# Patient Record
Sex: FEMALE | Race: WHITE | Employment: PART TIME | URBAN - METROPOLITAN AREA
[De-identification: names, ages, dates, MRNs, and addresses within clinical notes are randomized per-mention and may not be internally consistent; named-entity substitution may affect disease eponyms.]

---

## 2017-04-14 ENCOUNTER — HOSPITAL ENCOUNTER (OUTPATIENT)
Age: 32
Setting detail: SPECIMEN
Discharge: HOME | End: 2017-04-14
Payer: MEDICARE

## 2017-04-17 LAB
HPV SAMPLE: ABNORMAL
HPV SOURCE: ABNORMAL
HPV, GENOTYPE 16: DETECTED
HPV, GENOTYPE 18: NOT DETECTED
HPV, HIGH RISK OTHER: NOT DETECTED
HPV, INTERPRETATION: ABNORMAL

## 2017-04-19 LAB — CYTOLOGY REPORT: NORMAL

## 2017-10-26 ENCOUNTER — HOSPITAL ENCOUNTER (EMERGENCY)
Age: 32
Discharge: HOME | End: 2017-10-26
Attending: EMERGENCY MEDICINE
Payer: MEDICARE

## 2017-10-26 VITALS
DIASTOLIC BLOOD PRESSURE: 92 MMHG | TEMPERATURE: 97.6 F | SYSTOLIC BLOOD PRESSURE: 145 MMHG | BODY MASS INDEX: 28.04 KG/M2 | WEIGHT: 185 LBS | RESPIRATION RATE: 18 BRPM | OXYGEN SATURATION: 95 % | HEIGHT: 68 IN | HEART RATE: 77 BPM

## 2017-10-26 DIAGNOSIS — J02.9 ACUTE PHARYNGITIS, UNSPECIFIED ETIOLOGY: Primary | ICD-10-CM

## 2017-10-26 DIAGNOSIS — J01.00 ACUTE NON-RECURRENT MAXILLARY SINUSITIS: ICD-10-CM

## 2017-10-26 PROCEDURE — 99282 EMERGENCY DEPT VISIT SF MDM: CPT

## 2017-10-26 RX ORDER — AMOXICILLIN 250 MG/1
500 CAPSULE ORAL 3 TIMES DAILY
Qty: 60 CAPSULE | Refills: 0 | Status: SHIPPED | OUTPATIENT
Start: 2017-10-26 | End: 2017-11-05

## 2017-10-26 ASSESSMENT — PAIN DESCRIPTION - LOCATION: LOCATION: THROAT

## 2017-10-26 ASSESSMENT — PAIN SCALES - GENERAL: PAINLEVEL_OUTOF10: 10

## 2017-10-26 ASSESSMENT — PAIN DESCRIPTION - DESCRIPTORS: DESCRIPTORS: CONSTANT;SHARP;SORE

## 2017-10-26 ASSESSMENT — PAIN DESCRIPTION - FREQUENCY: FREQUENCY: CONTINUOUS

## 2018-04-21 ENCOUNTER — APPOINTMENT (OUTPATIENT)
Dept: CT IMAGING | Age: 33
End: 2018-04-21
Payer: MEDICARE

## 2018-04-21 ENCOUNTER — HOSPITAL ENCOUNTER (EMERGENCY)
Age: 33
Discharge: HOME | End: 2018-04-21
Attending: EMERGENCY MEDICINE
Payer: MEDICARE

## 2018-04-21 VITALS
HEART RATE: 80 BPM | OXYGEN SATURATION: 96 % | TEMPERATURE: 98.2 F | RESPIRATION RATE: 18 BRPM | HEIGHT: 68 IN | DIASTOLIC BLOOD PRESSURE: 70 MMHG | SYSTOLIC BLOOD PRESSURE: 120 MMHG | WEIGHT: 180 LBS | BODY MASS INDEX: 27.28 KG/M2

## 2018-04-21 DIAGNOSIS — R11.15 NON-INTRACTABLE CYCLICAL VOMITING WITH NAUSEA: Primary | ICD-10-CM

## 2018-04-21 DIAGNOSIS — R10.84 GENERALIZED ABDOMINAL PAIN: ICD-10-CM

## 2018-04-21 LAB
-: NORMAL
ABSOLUTE EOS #: 0 K/UL (ref 0–0.4)
ABSOLUTE IMMATURE GRANULOCYTE: ABNORMAL K/UL (ref 0–0.3)
ABSOLUTE LYMPH #: 1.5 K/UL (ref 1–4.8)
ABSOLUTE MONO #: 0.2 K/UL (ref 0.2–0.8)
ALBUMIN SERPL-MCNC: 4.7 G/DL (ref 3.5–5.2)
ALBUMIN/GLOBULIN RATIO: ABNORMAL (ref 1–2.5)
ALP BLD-CCNC: 76 U/L (ref 35–104)
ALT SERPL-CCNC: 32 U/L (ref 5–33)
AMORPHOUS: NORMAL
ANION GAP SERPL CALCULATED.3IONS-SCNC: 18 MMOL/L (ref 9–17)
AST SERPL-CCNC: 32 U/L
BACTERIA: NORMAL
BASOPHILS # BLD: 3 % (ref 0–2)
BASOPHILS ABSOLUTE: 0.2 K/UL (ref 0–0.2)
BILIRUB SERPL-MCNC: 0.23 MG/DL (ref 0.3–1.2)
BILIRUBIN URINE: NEGATIVE
BUN BLDV-MCNC: 7 MG/DL (ref 6–20)
BUN/CREAT BLD: 11 (ref 9–20)
CALCIUM SERPL-MCNC: 9.1 MG/DL (ref 8.6–10.4)
CASTS UA: NORMAL /LPF
CHLORIDE BLD-SCNC: 103 MMOL/L (ref 98–107)
CHP ED QC CHECK: NORMAL
CO2: 22 MMOL/L (ref 20–31)
COLOR: YELLOW
COMMENT UA: ABNORMAL
CREAT SERPL-MCNC: 0.66 MG/DL (ref 0.5–0.9)
CRYSTALS, UA: NORMAL /HPF
DIFFERENTIAL TYPE: ABNORMAL
EOSINOPHILS RELATIVE PERCENT: 0 % (ref 1–4)
EPITHELIAL CELLS UA: NORMAL /HPF (ref 0–5)
GFR AFRICAN AMERICAN: >60 ML/MIN
GFR NON-AFRICAN AMERICAN: >60 ML/MIN
GFR SERPL CREATININE-BSD FRML MDRD: ABNORMAL ML/MIN/{1.73_M2}
GFR SERPL CREATININE-BSD FRML MDRD: ABNORMAL ML/MIN/{1.73_M2}
GLUCOSE BLD-MCNC: 147 MG/DL (ref 70–99)
GLUCOSE URINE: NEGATIVE
HCT VFR BLD CALC: 45 % (ref 36–46)
HEMOGLOBIN: 15 G/DL (ref 12–16)
IMMATURE GRANULOCYTES: ABNORMAL %
KETONES, URINE: NEGATIVE
LEUKOCYTE ESTERASE, URINE: NEGATIVE
LIPASE: 26 U/L (ref 13–60)
LYMPHOCYTES # BLD: 23 % (ref 24–44)
MCH RBC QN AUTO: 30.2 PG (ref 26–34)
MCHC RBC AUTO-ENTMCNC: 33.3 G/DL (ref 31–37)
MCV RBC AUTO: 90.7 FL (ref 80–100)
MONOCYTES # BLD: 3 % (ref 1–7)
MUCUS: NORMAL
NITRITE, URINE: NEGATIVE
NRBC AUTOMATED: ABNORMAL PER 100 WBC
OTHER OBSERVATIONS UA: NORMAL
PDW BLD-RTO: 13.4 % (ref 11.5–14.5)
PH UA: 8 (ref 5–8)
PLATELET # BLD: 324 K/UL (ref 130–400)
PLATELET ESTIMATE: ABNORMAL
PMV BLD AUTO: 7.6 FL (ref 6–12)
POTASSIUM SERPL-SCNC: 3.6 MMOL/L (ref 3.7–5.3)
PREGNANCY TEST URINE, POC: NORMAL
PROTEIN UA: NEGATIVE
RBC # BLD: 4.96 M/UL (ref 4–5.2)
RBC # BLD: ABNORMAL 10*6/UL
RBC UA: NORMAL /HPF (ref 0–2)
RENAL EPITHELIAL, UA: NORMAL /HPF
SEG NEUTROPHILS: 71 % (ref 36–66)
SEGMENTED NEUTROPHILS ABSOLUTE COUNT: 4.6 K/UL (ref 1.8–7.7)
SODIUM BLD-SCNC: 143 MMOL/L (ref 135–144)
SPECIFIC GRAVITY UA: 1.02 (ref 1–1.03)
TOTAL PROTEIN: 8.1 G/DL (ref 6.4–8.3)
TRICHOMONAS: NORMAL
TURBIDITY: CLEAR
URINE HGB: ABNORMAL
UROBILINOGEN, URINE: NORMAL
WBC # BLD: 6.6 K/UL (ref 3.5–11)
WBC # BLD: ABNORMAL 10*3/UL
WBC UA: NORMAL /HPF (ref 0–5)
YEAST: NORMAL

## 2018-04-21 PROCEDURE — 96361 HYDRATE IV INFUSION ADD-ON: CPT

## 2018-04-21 PROCEDURE — 81001 URINALYSIS AUTO W/SCOPE: CPT

## 2018-04-21 PROCEDURE — 96376 TX/PRO/DX INJ SAME DRUG ADON: CPT

## 2018-04-21 PROCEDURE — 6360000004 HC RX CONTRAST MEDICATION: Performed by: EMERGENCY MEDICINE

## 2018-04-21 PROCEDURE — 80053 COMPREHEN METABOLIC PANEL: CPT

## 2018-04-21 PROCEDURE — 83690 ASSAY OF LIPASE: CPT

## 2018-04-21 PROCEDURE — 96374 THER/PROPH/DIAG INJ IV PUSH: CPT

## 2018-04-21 PROCEDURE — 74177 CT ABD & PELVIS W/CONTRAST: CPT

## 2018-04-21 PROCEDURE — 96372 THER/PROPH/DIAG INJ SC/IM: CPT

## 2018-04-21 PROCEDURE — 2580000003 HC RX 258: Performed by: EMERGENCY MEDICINE

## 2018-04-21 PROCEDURE — 84703 CHORIONIC GONADOTROPIN ASSAY: CPT

## 2018-04-21 PROCEDURE — 85025 COMPLETE CBC W/AUTO DIFF WBC: CPT

## 2018-04-21 PROCEDURE — 6360000002 HC RX W HCPCS: Performed by: EMERGENCY MEDICINE

## 2018-04-21 PROCEDURE — 96375 TX/PRO/DX INJ NEW DRUG ADDON: CPT

## 2018-04-21 PROCEDURE — 99284 EMERGENCY DEPT VISIT MOD MDM: CPT

## 2018-04-21 RX ORDER — SODIUM CHLORIDE 0.9 % (FLUSH) 0.9 %
10 SYRINGE (ML) INJECTION PRN
Status: DISCONTINUED | OUTPATIENT
Start: 2018-04-21 | End: 2018-04-21 | Stop reason: HOSPADM

## 2018-04-21 RX ORDER — 0.9 % SODIUM CHLORIDE 0.9 %
50 INTRAVENOUS SOLUTION INTRAVENOUS ONCE
Status: COMPLETED | OUTPATIENT
Start: 2018-04-21 | End: 2018-04-21

## 2018-04-21 RX ORDER — SODIUM CHLORIDE 9 MG/ML
INJECTION, SOLUTION INTRAVENOUS CONTINUOUS
Status: DISCONTINUED | OUTPATIENT
Start: 2018-04-21 | End: 2018-04-21 | Stop reason: HOSPADM

## 2018-04-21 RX ORDER — PANTOPRAZOLE SODIUM 20 MG/1
40 TABLET, DELAYED RELEASE ORAL DAILY
Qty: 30 TABLET | Refills: 0 | Status: SHIPPED | OUTPATIENT
Start: 2018-04-21 | End: 2020-01-04

## 2018-04-21 RX ORDER — DIPHENHYDRAMINE HYDROCHLORIDE 50 MG/ML
25 INJECTION INTRAMUSCULAR; INTRAVENOUS ONCE
Status: COMPLETED | OUTPATIENT
Start: 2018-04-21 | End: 2018-04-21

## 2018-04-21 RX ORDER — ONDANSETRON 4 MG/1
8 TABLET, ORALLY DISINTEGRATING ORAL ONCE
Status: COMPLETED | OUTPATIENT
Start: 2018-04-21 | End: 2018-04-21

## 2018-04-21 RX ORDER — MORPHINE SULFATE 4 MG/ML
4 INJECTION, SOLUTION INTRAMUSCULAR; INTRAVENOUS ONCE
Status: COMPLETED | OUTPATIENT
Start: 2018-04-21 | End: 2018-04-21

## 2018-04-21 RX ORDER — PROMETHAZINE HYDROCHLORIDE 25 MG/ML
25 INJECTION, SOLUTION INTRAMUSCULAR; INTRAVENOUS ONCE
Status: COMPLETED | OUTPATIENT
Start: 2018-04-21 | End: 2018-04-21

## 2018-04-21 RX ORDER — HALOPERIDOL 5 MG/ML
2 INJECTION INTRAMUSCULAR ONCE
Status: COMPLETED | OUTPATIENT
Start: 2018-04-21 | End: 2018-04-21

## 2018-04-21 RX ORDER — DICYCLOMINE HYDROCHLORIDE 10 MG/ML
20 INJECTION INTRAMUSCULAR ONCE
Status: COMPLETED | OUTPATIENT
Start: 2018-04-21 | End: 2018-04-21

## 2018-04-21 RX ORDER — 0.9 % SODIUM CHLORIDE 0.9 %
2000 INTRAVENOUS SOLUTION INTRAVENOUS ONCE
Status: COMPLETED | OUTPATIENT
Start: 2018-04-21 | End: 2018-04-21

## 2018-04-21 RX ORDER — ONDANSETRON 4 MG/1
8 TABLET, ORALLY DISINTEGRATING ORAL EVERY 8 HOURS PRN
Qty: 20 TABLET | Refills: 0 | Status: SHIPPED | OUTPATIENT
Start: 2018-04-21 | End: 2018-10-07

## 2018-04-21 RX ORDER — PROMETHAZINE HYDROCHLORIDE 25 MG/1
25 TABLET ORAL EVERY 6 HOURS PRN
Qty: 20 TABLET | Refills: 0 | Status: SHIPPED | OUTPATIENT
Start: 2018-04-21 | End: 2018-04-28

## 2018-04-21 RX ADMIN — DICYCLOMINE HYDROCHLORIDE 20 MG: 20 INJECTION, SOLUTION INTRAMUSCULAR at 14:18

## 2018-04-21 RX ADMIN — PROMETHAZINE HYDROCHLORIDE 25 MG: 25 INJECTION INTRAMUSCULAR; INTRAVENOUS at 12:13

## 2018-04-21 RX ADMIN — ONDANSETRON 8 MG: 4 TABLET, ORALLY DISINTEGRATING ORAL at 12:14

## 2018-04-21 RX ADMIN — MORPHINE SULFATE 4 MG: 4 INJECTION INTRAVENOUS at 14:18

## 2018-04-21 RX ADMIN — SODIUM CHLORIDE 2000 ML: 9 INJECTION, SOLUTION INTRAVENOUS at 12:13

## 2018-04-21 RX ADMIN — DIPHENHYDRAMINE HYDROCHLORIDE 25 MG: 50 INJECTION, SOLUTION INTRAMUSCULAR; INTRAVENOUS at 12:14

## 2018-04-21 RX ADMIN — SODIUM CHLORIDE: 9 INJECTION, SOLUTION INTRAVENOUS at 16:19

## 2018-04-21 RX ADMIN — Medication 10 ML: at 15:59

## 2018-04-21 RX ADMIN — SODIUM CHLORIDE 50 ML: 9 INJECTION, SOLUTION INTRAVENOUS at 15:58

## 2018-04-21 RX ADMIN — MORPHINE SULFATE 4 MG: 4 INJECTION INTRAVENOUS at 16:13

## 2018-04-21 RX ADMIN — IOPAMIDOL 125 ML: 755 INJECTION, SOLUTION INTRAVENOUS at 15:58

## 2018-04-21 RX ADMIN — HALOPERIDOL LACTATE 2 MG: 5 INJECTION, SOLUTION INTRAMUSCULAR at 16:13

## 2018-04-21 ASSESSMENT — PAIN SCALES - GENERAL
PAINLEVEL_OUTOF10: 10
PAINLEVEL_OUTOF10: 9
PAINLEVEL_OUTOF10: 9
PAINLEVEL_OUTOF10: 6

## 2018-04-21 ASSESSMENT — ENCOUNTER SYMPTOMS
TROUBLE SWALLOWING: 0
SORE THROAT: 1
ABDOMINAL PAIN: 1
BLOOD IN STOOL: 0
VOMITING: 1
CONSTIPATION: 0
RESPIRATORY NEGATIVE: 1
DIARRHEA: 0
RHINORRHEA: 1
NAUSEA: 1

## 2018-04-21 ASSESSMENT — PAIN DESCRIPTION - LOCATION: LOCATION: ABDOMEN

## 2018-04-21 ASSESSMENT — PAIN DESCRIPTION - PAIN TYPE: TYPE: ACUTE PAIN

## 2018-04-21 ASSESSMENT — PAIN DESCRIPTION - DESCRIPTORS: DESCRIPTORS: ACHING

## 2018-04-23 LAB — HCG, PREGNANCY URINE (POC): NEGATIVE

## 2018-05-07 ENCOUNTER — HOSPITAL ENCOUNTER (EMERGENCY)
Age: 33
Discharge: HOME | End: 2018-05-07
Attending: EMERGENCY MEDICINE
Payer: MEDICARE

## 2018-05-07 VITALS
WEIGHT: 185.44 LBS | RESPIRATION RATE: 18 BRPM | HEART RATE: 116 BPM | HEIGHT: 68 IN | DIASTOLIC BLOOD PRESSURE: 90 MMHG | OXYGEN SATURATION: 98 % | SYSTOLIC BLOOD PRESSURE: 145 MMHG | TEMPERATURE: 98.1 F | BODY MASS INDEX: 28.1 KG/M2

## 2018-05-07 DIAGNOSIS — R11.15 NON-INTRACTABLE CYCLICAL VOMITING WITH NAUSEA: Primary | ICD-10-CM

## 2018-05-07 LAB
ABSOLUTE EOS #: 0 K/UL (ref 0–0.4)
ABSOLUTE IMMATURE GRANULOCYTE: ABNORMAL K/UL (ref 0–0.3)
ABSOLUTE LYMPH #: 1.7 K/UL (ref 1–4.8)
ABSOLUTE MONO #: 0.3 K/UL (ref 0.2–0.8)
ALBUMIN SERPL-MCNC: 4.6 G/DL (ref 3.5–5.2)
ALBUMIN/GLOBULIN RATIO: ABNORMAL (ref 1–2.5)
ALP BLD-CCNC: 72 U/L (ref 35–104)
ALT SERPL-CCNC: 24 U/L (ref 5–33)
AMYLASE: 109 U/L (ref 28–100)
ANION GAP SERPL CALCULATED.3IONS-SCNC: 19 MMOL/L (ref 9–17)
AST SERPL-CCNC: 20 U/L
BASOPHILS # BLD: 0 % (ref 0–2)
BASOPHILS ABSOLUTE: 0 K/UL (ref 0–0.2)
BILIRUB SERPL-MCNC: 0.31 MG/DL (ref 0.3–1.2)
BILIRUBIN DIRECT: <0.08 MG/DL
BILIRUBIN, INDIRECT: ABNORMAL MG/DL (ref 0–1)
BUN BLDV-MCNC: 6 MG/DL (ref 6–20)
BUN/CREAT BLD: 8 (ref 9–20)
CALCIUM SERPL-MCNC: 9.5 MG/DL (ref 8.6–10.4)
CHLORIDE BLD-SCNC: 99 MMOL/L (ref 98–107)
CO2: 21 MMOL/L (ref 20–31)
CREAT SERPL-MCNC: 0.72 MG/DL (ref 0.5–0.9)
DIFFERENTIAL TYPE: ABNORMAL
EOSINOPHILS RELATIVE PERCENT: 0 % (ref 1–4)
GFR AFRICAN AMERICAN: >60 ML/MIN
GFR NON-AFRICAN AMERICAN: >60 ML/MIN
GFR SERPL CREATININE-BSD FRML MDRD: ABNORMAL ML/MIN/{1.73_M2}
GFR SERPL CREATININE-BSD FRML MDRD: ABNORMAL ML/MIN/{1.73_M2}
GLOBULIN: ABNORMAL G/DL (ref 1.5–3.8)
GLUCOSE BLD-MCNC: 143 MG/DL (ref 70–99)
HCG QUALITATIVE: NEGATIVE
HCT VFR BLD CALC: 46.1 % (ref 36–46)
HEMOGLOBIN: 15.2 G/DL (ref 12–16)
IMMATURE GRANULOCYTES: ABNORMAL %
LIPASE: 17 U/L (ref 13–60)
LYMPHOCYTES # BLD: 16 % (ref 24–44)
MCH RBC QN AUTO: 30 PG (ref 26–34)
MCHC RBC AUTO-ENTMCNC: 33 G/DL (ref 31–37)
MCV RBC AUTO: 91.1 FL (ref 80–100)
MONOCYTES # BLD: 3 % (ref 1–7)
NRBC AUTOMATED: ABNORMAL PER 100 WBC
PDW BLD-RTO: 13.4 % (ref 11.5–14.5)
PLATELET # BLD: 367 K/UL (ref 130–400)
PLATELET ESTIMATE: ABNORMAL
PMV BLD AUTO: 7.8 FL (ref 6–12)
POTASSIUM SERPL-SCNC: 3.4 MMOL/L (ref 3.7–5.3)
RBC # BLD: 5.06 M/UL (ref 4–5.2)
RBC # BLD: ABNORMAL 10*6/UL
SEG NEUTROPHILS: 81 % (ref 36–66)
SEGMENTED NEUTROPHILS ABSOLUTE COUNT: 8.5 K/UL (ref 1.8–7.7)
SODIUM BLD-SCNC: 139 MMOL/L (ref 135–144)
TOTAL PROTEIN: 8.4 G/DL (ref 6.4–8.3)
WBC # BLD: 10.5 K/UL (ref 3.5–11)
WBC # BLD: ABNORMAL 10*3/UL

## 2018-05-07 PROCEDURE — 80048 BASIC METABOLIC PNL TOTAL CA: CPT

## 2018-05-07 PROCEDURE — 96372 THER/PROPH/DIAG INJ SC/IM: CPT

## 2018-05-07 PROCEDURE — 96375 TX/PRO/DX INJ NEW DRUG ADDON: CPT

## 2018-05-07 PROCEDURE — 2500000003 HC RX 250 WO HCPCS: Performed by: EMERGENCY MEDICINE

## 2018-05-07 PROCEDURE — 6360000002 HC RX W HCPCS: Performed by: EMERGENCY MEDICINE

## 2018-05-07 PROCEDURE — 2580000003 HC RX 258: Performed by: EMERGENCY MEDICINE

## 2018-05-07 PROCEDURE — 96374 THER/PROPH/DIAG INJ IV PUSH: CPT

## 2018-05-07 PROCEDURE — 82150 ASSAY OF AMYLASE: CPT

## 2018-05-07 PROCEDURE — 99284 EMERGENCY DEPT VISIT MOD MDM: CPT

## 2018-05-07 PROCEDURE — C9113 INJ PANTOPRAZOLE SODIUM, VIA: HCPCS | Performed by: EMERGENCY MEDICINE

## 2018-05-07 PROCEDURE — 84703 CHORIONIC GONADOTROPIN ASSAY: CPT

## 2018-05-07 PROCEDURE — 80076 HEPATIC FUNCTION PANEL: CPT

## 2018-05-07 PROCEDURE — 85025 COMPLETE CBC W/AUTO DIFF WBC: CPT

## 2018-05-07 PROCEDURE — 83690 ASSAY OF LIPASE: CPT

## 2018-05-07 RX ORDER — 0.9 % SODIUM CHLORIDE 0.9 %
1000 INTRAVENOUS SOLUTION INTRAVENOUS ONCE
Status: COMPLETED | OUTPATIENT
Start: 2018-05-07 | End: 2018-05-07

## 2018-05-07 RX ORDER — ONDANSETRON 4 MG/1
4 TABLET, ORALLY DISINTEGRATING ORAL EVERY 8 HOURS PRN
Qty: 20 TABLET | Refills: 0 | Status: SHIPPED | OUTPATIENT
Start: 2018-05-07 | End: 2018-10-07

## 2018-05-07 RX ORDER — PANTOPRAZOLE SODIUM 40 MG/10ML
40 INJECTION, POWDER, LYOPHILIZED, FOR SOLUTION INTRAVENOUS ONCE
Status: COMPLETED | OUTPATIENT
Start: 2018-05-07 | End: 2018-05-07

## 2018-05-07 RX ORDER — 0.9 % SODIUM CHLORIDE 0.9 %
10 VIAL (ML) INJECTION ONCE
Status: COMPLETED | OUTPATIENT
Start: 2018-05-07 | End: 2018-05-07

## 2018-05-07 RX ORDER — OMEPRAZOLE 40 MG/1
40 CAPSULE, DELAYED RELEASE ORAL DAILY
Qty: 30 CAPSULE | Refills: 0 | Status: SHIPPED | OUTPATIENT
Start: 2018-05-07 | End: 2018-10-07

## 2018-05-07 RX ORDER — LORAZEPAM 2 MG/ML
1 INJECTION INTRAMUSCULAR ONCE
Status: COMPLETED | OUTPATIENT
Start: 2018-05-07 | End: 2018-05-07

## 2018-05-07 RX ORDER — LORAZEPAM 1 MG/1
1 TABLET ORAL DAILY PRN
Qty: 10 TABLET | Refills: 0 | Status: SHIPPED | OUTPATIENT
Start: 2018-05-07 | End: 2018-06-06

## 2018-05-07 RX ORDER — CHLORPROMAZINE HYDROCHLORIDE 25 MG/ML
25 INJECTION INTRAMUSCULAR ONCE
Status: COMPLETED | OUTPATIENT
Start: 2018-05-07 | End: 2018-05-07

## 2018-05-07 RX ORDER — CHLORPROMAZINE HYDROCHLORIDE 25 MG/1
25 TABLET, FILM COATED ORAL 3 TIMES DAILY PRN
Qty: 15 TABLET | Refills: 0 | Status: SHIPPED | OUTPATIENT
Start: 2018-05-07 | End: 2020-01-04

## 2018-05-07 RX ADMIN — LORAZEPAM 1 MG: 2 INJECTION INTRAMUSCULAR; INTRAVENOUS at 01:37

## 2018-05-07 RX ADMIN — SODIUM CHLORIDE 1000 ML: 9 INJECTION, SOLUTION INTRAVENOUS at 01:36

## 2018-05-07 RX ADMIN — Medication 10 ML: at 01:36

## 2018-05-07 RX ADMIN — PANTOPRAZOLE SODIUM 40 MG: 40 INJECTION, POWDER, FOR SOLUTION INTRAVENOUS at 01:36

## 2018-05-07 RX ADMIN — CHLORPROMAZINE HYDROCHLORIDE 25 MG: 25 INJECTION INTRAMUSCULAR at 01:38

## 2018-05-07 ASSESSMENT — PAIN SCALES - GENERAL
PAINLEVEL_OUTOF10: 9
PAINLEVEL_OUTOF10: 9

## 2018-05-07 ASSESSMENT — PAIN DESCRIPTION - ORIENTATION
ORIENTATION: LOWER
ORIENTATION: LOWER

## 2018-05-07 ASSESSMENT — PAIN DESCRIPTION - PAIN TYPE
TYPE: ACUTE PAIN
TYPE: ACUTE PAIN

## 2018-05-07 ASSESSMENT — PAIN DESCRIPTION - LOCATION
LOCATION: ABDOMEN
LOCATION: ABDOMEN

## 2018-05-07 ASSESSMENT — PAIN DESCRIPTION - DESCRIPTORS
DESCRIPTORS: ACHING
DESCRIPTORS: ACHING

## 2018-05-07 ASSESSMENT — ENCOUNTER SYMPTOMS
VOMITING: 1
RESPIRATORY NEGATIVE: 1
NAUSEA: 1

## 2018-05-07 ASSESSMENT — PAIN DESCRIPTION - FREQUENCY: FREQUENCY: CONTINUOUS

## 2018-08-23 ENCOUNTER — HOSPITAL ENCOUNTER (EMERGENCY)
Age: 33
Discharge: HOME | End: 2018-08-23
Attending: EMERGENCY MEDICINE
Payer: MEDICARE

## 2018-08-23 VITALS
DIASTOLIC BLOOD PRESSURE: 102 MMHG | SYSTOLIC BLOOD PRESSURE: 151 MMHG | RESPIRATION RATE: 16 BRPM | WEIGHT: 193.25 LBS | OXYGEN SATURATION: 100 % | TEMPERATURE: 97.9 F | HEIGHT: 68 IN | BODY MASS INDEX: 29.29 KG/M2 | HEART RATE: 78 BPM

## 2018-08-23 DIAGNOSIS — L23.9 ALLERGIC CONTACT DERMATITIS, UNSPECIFIED TRIGGER: Primary | ICD-10-CM

## 2018-08-23 PROCEDURE — 96374 THER/PROPH/DIAG INJ IV PUSH: CPT

## 2018-08-23 PROCEDURE — 99282 EMERGENCY DEPT VISIT SF MDM: CPT

## 2018-08-23 PROCEDURE — 6360000002 HC RX W HCPCS: Performed by: EMERGENCY MEDICINE

## 2018-08-23 RX ORDER — HYDROXYZINE HYDROCHLORIDE 25 MG/1
25 TABLET, FILM COATED ORAL EVERY 8 HOURS PRN
Qty: 20 TABLET | Refills: 0 | Status: SHIPPED | OUTPATIENT
Start: 2018-08-23 | End: 2018-09-02

## 2018-08-23 RX ORDER — PREDNISONE 10 MG/1
TABLET ORAL
Qty: 30 TABLET | Refills: 0 | Status: SHIPPED | OUTPATIENT
Start: 2018-08-23 | End: 2020-01-04

## 2018-08-23 RX ORDER — METHYLPREDNISOLONE SODIUM SUCCINATE 125 MG/2ML
125 INJECTION, POWDER, LYOPHILIZED, FOR SOLUTION INTRAMUSCULAR; INTRAVENOUS ONCE
Status: COMPLETED | OUTPATIENT
Start: 2018-08-23 | End: 2018-08-23

## 2018-08-23 RX ORDER — TRIAMCINOLONE ACETONIDE 0.25 MG/G
OINTMENT TOPICAL
Qty: 1 TUBE | Refills: 0 | Status: SHIPPED | OUTPATIENT
Start: 2018-08-23 | End: 2020-01-04

## 2018-08-23 RX ADMIN — METHYLPREDNISOLONE SODIUM SUCCINATE 125 MG: 125 INJECTION, POWDER, FOR SOLUTION INTRAMUSCULAR; INTRAVENOUS at 08:24

## 2018-08-23 ASSESSMENT — ENCOUNTER SYMPTOMS
FACIAL SWELLING: 0
ABDOMINAL PAIN: 0
EYE DISCHARGE: 0
EYE REDNESS: 0
VOMITING: 0
COUGH: 0
DIARRHEA: 0
SHORTNESS OF BREATH: 0
CONSTIPATION: 0
COLOR CHANGE: 0

## 2018-08-23 ASSESSMENT — PAIN SCALES - GENERAL: PAINLEVEL_OUTOF10: 6

## 2018-08-23 NOTE — ED NOTES
C/o red scaly rash to chest,omar groin and back with itching. sts has been there past week. sts feels fine otherwise. denies fever.      Heraclio Briceno RN  08/23/18 9629

## 2018-08-23 NOTE — ED PROVIDER NOTES
Itching     Dispense:  20 tablet     Refill:  0       Medical Decision Making: The patient is given IM Solu-Medrol. She is given also prescriptions for prednisone Kenalog and Atarax. Treatment diagnosis and follow-up were discussed with the patient. CONSULTS:  None    PROCEDURES:  None    FINAL IMPRESSION      1. Allergic contact dermatitis, unspecified trigger          DISPOSITION/PLAN   DISPOSITION Decision To Discharge 08/23/2018 08:06:16 AM      PATIENT REFERRED TO:   [facility] ED  [address] [facility]  [phone]    If symptoms worsen      DISCHARGE MEDICATIONS:     New Prescriptions    HYDROXYZINE (ATARAX) 25 MG TABLET    Take 1 tablet by mouth every 8 hours as needed for Itching    PREDNISONE (DELTASONE) 10 MG TABLET    Take 4 by mouth daily for 3 days then  3 by mouth daily for 3 days then  2 by mouth daily for 3 days then  1 by mouth daily for 3 days    TRIAMCINOLONE (KENALOG) 0.025 % OINTMENT    Apply topically 2 times daily.          (Please note that portions of this note were completed with a voice recognition program.  Efforts were made to edit the dictations but occasionally words are mis-transcribed.)    Gilberto Medeiros MD  Attending Emergency Physician             Gilberto Medeiros MD  08/23/18 1842

## 2018-10-07 ENCOUNTER — HOSPITAL ENCOUNTER (EMERGENCY)
Age: 33
Discharge: HOME | End: 2018-10-07
Attending: EMERGENCY MEDICINE
Payer: MEDICARE

## 2018-10-07 VITALS
HEIGHT: 68 IN | SYSTOLIC BLOOD PRESSURE: 143 MMHG | DIASTOLIC BLOOD PRESSURE: 102 MMHG | TEMPERATURE: 98 F | OXYGEN SATURATION: 99 % | BODY MASS INDEX: 28.99 KG/M2 | HEART RATE: 115 BPM | RESPIRATION RATE: 16 BRPM | WEIGHT: 191.3 LBS

## 2018-10-07 DIAGNOSIS — R11.2 NAUSEA AND VOMITING, INTRACTABILITY OF VOMITING NOT SPECIFIED, UNSPECIFIED VOMITING TYPE: Primary | ICD-10-CM

## 2018-10-07 DIAGNOSIS — K29.00 ACUTE GASTRITIS WITHOUT HEMORRHAGE, UNSPECIFIED GASTRITIS TYPE: ICD-10-CM

## 2018-10-07 LAB
-: ABNORMAL
ABSOLUTE EOS #: 0 K/UL (ref 0–0.4)
ABSOLUTE IMMATURE GRANULOCYTE: ABNORMAL K/UL (ref 0–0.3)
ABSOLUTE LYMPH #: 0.7 K/UL (ref 1–4.8)
ABSOLUTE MONO #: 0.2 K/UL (ref 0.2–0.8)
ALBUMIN SERPL-MCNC: 5.2 G/DL (ref 3.5–5.2)
ALBUMIN/GLOBULIN RATIO: ABNORMAL (ref 1–2.5)
ALP BLD-CCNC: 95 U/L (ref 35–104)
ALT SERPL-CCNC: 37 U/L (ref 5–33)
AMORPHOUS: ABNORMAL
AMYLASE: 57 U/L (ref 28–100)
ANION GAP SERPL CALCULATED.3IONS-SCNC: 25 MMOL/L (ref 9–17)
AST SERPL-CCNC: 36 U/L
BACTERIA: ABNORMAL
BASOPHILS # BLD: 1 % (ref 0–2)
BASOPHILS ABSOLUTE: 0.1 K/UL (ref 0–0.2)
BILIRUB SERPL-MCNC: 0.58 MG/DL (ref 0.3–1.2)
BILIRUBIN DIRECT: 0.14 MG/DL
BILIRUBIN URINE: NEGATIVE
BILIRUBIN, INDIRECT: 0.44 MG/DL (ref 0–1)
BUN BLDV-MCNC: 6 MG/DL (ref 6–20)
BUN/CREAT BLD: 9 (ref 9–20)
CALCIUM SERPL-MCNC: 9.4 MG/DL (ref 8.6–10.4)
CASTS UA: ABNORMAL /LPF
CHLORIDE BLD-SCNC: 103 MMOL/L (ref 98–107)
CHP ED QC CHECK: NORMAL
CO2: 16 MMOL/L (ref 20–31)
COLOR: YELLOW
COMMENT UA: ABNORMAL
CREAT SERPL-MCNC: 0.7 MG/DL (ref 0.5–0.9)
CRYSTALS, UA: ABNORMAL /HPF
DIFFERENTIAL TYPE: ABNORMAL
EOSINOPHILS RELATIVE PERCENT: 0 % (ref 1–4)
EPITHELIAL CELLS UA: ABNORMAL /HPF (ref 0–5)
ETHANOL PERCENT: 0.01 %
ETHANOL: 14 MG/DL
GFR AFRICAN AMERICAN: >60 ML/MIN
GFR NON-AFRICAN AMERICAN: >60 ML/MIN
GFR SERPL CREATININE-BSD FRML MDRD: ABNORMAL ML/MIN/{1.73_M2}
GFR SERPL CREATININE-BSD FRML MDRD: ABNORMAL ML/MIN/{1.73_M2}
GLOBULIN: ABNORMAL G/DL (ref 1.5–3.8)
GLUCOSE BLD-MCNC: 126 MG/DL (ref 70–99)
GLUCOSE URINE: NEGATIVE
HCT VFR BLD CALC: 42.6 % (ref 36–46)
HEMOGLOBIN: 14.6 G/DL (ref 12–16)
IMMATURE GRANULOCYTES: ABNORMAL %
KETONES, URINE: ABNORMAL
LEUKOCYTE ESTERASE, URINE: NEGATIVE
LIPASE: 26 U/L (ref 13–60)
LYMPHOCYTES # BLD: 11 % (ref 24–44)
MCH RBC QN AUTO: 31.5 PG (ref 26–34)
MCHC RBC AUTO-ENTMCNC: 34.3 G/DL (ref 31–37)
MCV RBC AUTO: 91.8 FL (ref 80–100)
MONOCYTES # BLD: 3 % (ref 1–7)
MUCUS: ABNORMAL
NITRITE, URINE: NEGATIVE
NRBC AUTOMATED: ABNORMAL PER 100 WBC
OTHER OBSERVATIONS UA: ABNORMAL
PDW BLD-RTO: 16.3 % (ref 11.5–14.5)
PH UA: 5 (ref 5–8)
PLATELET # BLD: 290 K/UL (ref 130–400)
PLATELET ESTIMATE: ABNORMAL
PMV BLD AUTO: 7.5 FL (ref 6–12)
POTASSIUM SERPL-SCNC: 3.5 MMOL/L (ref 3.7–5.3)
PREGNANCY TEST URINE, POC: NORMAL
PROTEIN UA: NEGATIVE
RBC # BLD: 4.64 M/UL (ref 4–5.2)
RBC # BLD: ABNORMAL 10*6/UL
RBC UA: ABNORMAL /HPF (ref 0–2)
RENAL EPITHELIAL, UA: ABNORMAL /HPF
SEG NEUTROPHILS: 85 % (ref 36–66)
SEGMENTED NEUTROPHILS ABSOLUTE COUNT: 5.7 K/UL (ref 1.8–7.7)
SODIUM BLD-SCNC: 144 MMOL/L (ref 135–144)
SPECIFIC GRAVITY UA: 1.02 (ref 1–1.03)
TOTAL PROTEIN: 8.5 G/DL (ref 6.4–8.3)
TRICHOMONAS: ABNORMAL
TURBIDITY: CLEAR
URINE HGB: ABNORMAL
UROBILINOGEN, URINE: NORMAL
WBC # BLD: 6.8 K/UL (ref 3.5–11)
WBC # BLD: ABNORMAL 10*3/UL
WBC UA: ABNORMAL /HPF (ref 0–5)
YEAST: ABNORMAL

## 2018-10-07 PROCEDURE — 83690 ASSAY OF LIPASE: CPT

## 2018-10-07 PROCEDURE — 81001 URINALYSIS AUTO W/SCOPE: CPT

## 2018-10-07 PROCEDURE — 84703 CHORIONIC GONADOTROPIN ASSAY: CPT

## 2018-10-07 PROCEDURE — 6360000002 HC RX W HCPCS: Performed by: EMERGENCY MEDICINE

## 2018-10-07 PROCEDURE — 96361 HYDRATE IV INFUSION ADD-ON: CPT

## 2018-10-07 PROCEDURE — G0480 DRUG TEST DEF 1-7 CLASSES: HCPCS

## 2018-10-07 PROCEDURE — 2580000003 HC RX 258: Performed by: EMERGENCY MEDICINE

## 2018-10-07 PROCEDURE — 80076 HEPATIC FUNCTION PANEL: CPT

## 2018-10-07 PROCEDURE — 80048 BASIC METABOLIC PNL TOTAL CA: CPT

## 2018-10-07 PROCEDURE — 36415 COLL VENOUS BLD VENIPUNCTURE: CPT

## 2018-10-07 PROCEDURE — 99283 EMERGENCY DEPT VISIT LOW MDM: CPT

## 2018-10-07 PROCEDURE — 96375 TX/PRO/DX INJ NEW DRUG ADDON: CPT

## 2018-10-07 PROCEDURE — 96365 THER/PROPH/DIAG IV INF INIT: CPT

## 2018-10-07 PROCEDURE — C9113 INJ PANTOPRAZOLE SODIUM, VIA: HCPCS | Performed by: EMERGENCY MEDICINE

## 2018-10-07 PROCEDURE — 82150 ASSAY OF AMYLASE: CPT

## 2018-10-07 PROCEDURE — 85025 COMPLETE CBC W/AUTO DIFF WBC: CPT

## 2018-10-07 RX ORDER — ONDANSETRON 4 MG/1
4 TABLET, FILM COATED ORAL EVERY 8 HOURS PRN
Qty: 20 TABLET | Refills: 0 | Status: SHIPPED | OUTPATIENT
Start: 2018-10-07 | End: 2020-01-04

## 2018-10-07 RX ORDER — SODIUM CHLORIDE 9 MG/ML
INJECTION, SOLUTION INTRAVENOUS CONTINUOUS
Status: DISCONTINUED | OUTPATIENT
Start: 2018-10-07 | End: 2018-10-07 | Stop reason: HOSPADM

## 2018-10-07 RX ORDER — OMEPRAZOLE 20 MG/1
20 CAPSULE, DELAYED RELEASE ORAL DAILY
Qty: 20 CAPSULE | Refills: 0 | Status: SHIPPED | OUTPATIENT
Start: 2018-10-07 | End: 2020-01-04

## 2018-10-07 RX ORDER — PROMETHAZINE HYDROCHLORIDE 25 MG/ML
12.5 INJECTION, SOLUTION INTRAMUSCULAR; INTRAVENOUS ONCE
Status: COMPLETED | OUTPATIENT
Start: 2018-10-07 | End: 2018-10-07

## 2018-10-07 RX ADMIN — SODIUM CHLORIDE: 9 INJECTION, SOLUTION INTRAVENOUS at 16:06

## 2018-10-07 RX ADMIN — HYDROMORPHONE HYDROCHLORIDE 0.5 MG: 1 INJECTION, SOLUTION INTRAMUSCULAR; INTRAVENOUS; SUBCUTANEOUS at 16:06

## 2018-10-07 RX ADMIN — SODIUM CHLORIDE 80 MG: 9 INJECTION, SOLUTION INTRAVENOUS at 16:19

## 2018-10-07 RX ADMIN — PROMETHAZINE HYDROCHLORIDE 12.5 MG: 25 INJECTION INTRAMUSCULAR; INTRAVENOUS at 16:06

## 2018-10-07 ASSESSMENT — ENCOUNTER SYMPTOMS
EYES NEGATIVE: 1
NAUSEA: 1
ABDOMINAL PAIN: 1
RESPIRATORY NEGATIVE: 1
ALLERGIC/IMMUNOLOGIC NEGATIVE: 1
VOMITING: 1

## 2018-10-07 ASSESSMENT — PAIN DESCRIPTION - LOCATION: LOCATION: ABDOMEN

## 2018-10-07 ASSESSMENT — PAIN SCALES - GENERAL
PAINLEVEL_OUTOF10: 8
PAINLEVEL_OUTOF10: 10
PAINLEVEL_OUTOF10: 10

## 2018-10-07 ASSESSMENT — PAIN SCALES - WONG BAKER: WONGBAKER_NUMERICALRESPONSE: 10

## 2018-10-07 ASSESSMENT — PAIN DESCRIPTION - FREQUENCY: FREQUENCY: CONTINUOUS

## 2018-10-07 NOTE — ED PROVIDER NOTES
Social History Main Topics    Smoking status: Current Every Day Smoker     Packs/day: 0.50     Types: Cigarettes    Smokeless tobacco: Never Used    Alcohol use 14.4 oz/week     24 Cans of beer per week      Comment: social    Drug use: No    Sexual activity: Yes     Partners: Male     Other Topics Concern    None     Social History Narrative    None         REVIEW OF SYSTEMS    (2-9 systems for level 4, 10 or more for level 5)     Review of Systems   Constitutional: Negative. Eyes: Negative. Respiratory: Negative. Cardiovascular: Negative. Gastrointestinal: Positive for abdominal pain, nausea and vomiting. Genitourinary: Negative. Musculoskeletal: Negative. Skin: Negative. Allergic/Immunologic: Negative. Neurological: Negative. Hematological: Negative. Psychiatric/Behavioral: Negative. Except as noted above the remainder of the review of systems was reviewed and negative. PHYSICAL EXAM    (up to 7 for level 4, 8 or more for level 5)     ED Triage Vitals [10/07/18 1529]   BP Temp Temp Source Pulse Resp SpO2 Height Weight   (!) 143/102 98 °F (36.7 °C) Oral 115 16 99 % 5' 8\" (1.727 m) 191 lb 4.8 oz (86.8 kg)       Physical Exam   Constitutional: She is oriented to person, place, and time. She appears well-developed and well-nourished. HENT:   Head: Normocephalic and atraumatic. Eyes: EOM are normal.   Neck: Normal range of motion. Neck supple. Cardiovascular: Normal rate and regular rhythm. Pulmonary/Chest: Effort normal and breath sounds normal. No respiratory distress. She has no rales. She exhibits no tenderness. Abdominal: Soft. She exhibits no distension and no mass. There is no rebound. Tenderness in the epigastric area   Musculoskeletal: Normal range of motion. Neurological: She is alert and oriented to person, place, and time. She has normal reflexes. Skin: Skin is warm and dry.          DIAGNOSTIC RESULTS     EKG: All EKG's are interpreted by

## 2018-10-08 LAB — HCG, PREGNANCY URINE (POC): NEGATIVE

## 2018-12-01 ENCOUNTER — HOSPITAL ENCOUNTER (EMERGENCY)
Age: 33
Discharge: HOME | End: 2018-12-01
Attending: EMERGENCY MEDICINE
Payer: MEDICARE

## 2018-12-01 VITALS
TEMPERATURE: 98.9 F | SYSTOLIC BLOOD PRESSURE: 124 MMHG | BODY MASS INDEX: 28.04 KG/M2 | OXYGEN SATURATION: 98 % | HEART RATE: 97 BPM | RESPIRATION RATE: 17 BRPM | HEIGHT: 68 IN | DIASTOLIC BLOOD PRESSURE: 78 MMHG | WEIGHT: 185 LBS

## 2018-12-01 DIAGNOSIS — R11.15 NON-INTRACTABLE CYCLICAL VOMITING WITH NAUSEA: Primary | ICD-10-CM

## 2018-12-01 LAB
ABSOLUTE EOS #: 0 K/UL (ref 0–0.4)
ABSOLUTE IMMATURE GRANULOCYTE: ABNORMAL K/UL (ref 0–0.3)
ABSOLUTE LYMPH #: 1.1 K/UL (ref 1–4.8)
ABSOLUTE MONO #: 0.6 K/UL (ref 0.2–0.8)
ALBUMIN SERPL-MCNC: 5.3 G/DL (ref 3.5–5.2)
ALBUMIN/GLOBULIN RATIO: ABNORMAL (ref 1–2.5)
ALP BLD-CCNC: 73 U/L (ref 35–104)
ALT SERPL-CCNC: 38 U/L (ref 5–33)
ANION GAP SERPL CALCULATED.3IONS-SCNC: 23 MMOL/L (ref 9–17)
AST SERPL-CCNC: 32 U/L
BASOPHILS # BLD: 0 % (ref 0–2)
BASOPHILS ABSOLUTE: 0 K/UL (ref 0–0.2)
BILIRUB SERPL-MCNC: 0.67 MG/DL (ref 0.3–1.2)
BUN BLDV-MCNC: 6 MG/DL (ref 6–20)
BUN/CREAT BLD: 7 (ref 9–20)
CALCIUM SERPL-MCNC: 9.5 MG/DL (ref 8.6–10.4)
CHLORIDE BLD-SCNC: 96 MMOL/L (ref 98–107)
CHP ED QC CHECK: NORMAL
CO2: 22 MMOL/L (ref 20–31)
CREAT SERPL-MCNC: 0.92 MG/DL (ref 0.5–0.9)
DIFFERENTIAL TYPE: ABNORMAL
EKG ATRIAL RATE: 124 BPM
EKG P AXIS: 70 DEGREES
EKG P-R INTERVAL: 134 MS
EKG Q-T INTERVAL: 326 MS
EKG QRS DURATION: 80 MS
EKG QTC CALCULATION (BAZETT): 468 MS
EKG R AXIS: 83 DEGREES
EKG T AXIS: 44 DEGREES
EKG VENTRICULAR RATE: 124 BPM
EOSINOPHILS RELATIVE PERCENT: 0 % (ref 1–4)
GFR AFRICAN AMERICAN: >60 ML/MIN
GFR NON-AFRICAN AMERICAN: >60 ML/MIN
GFR SERPL CREATININE-BSD FRML MDRD: ABNORMAL ML/MIN/{1.73_M2}
GFR SERPL CREATININE-BSD FRML MDRD: ABNORMAL ML/MIN/{1.73_M2}
GLUCOSE BLD-MCNC: 142 MG/DL (ref 70–99)
HCT VFR BLD CALC: 43.8 % (ref 36–46)
HEMOGLOBIN: 14.9 G/DL (ref 12–16)
IMMATURE GRANULOCYTES: ABNORMAL %
LACTIC ACID: 2.6 MMOL/L (ref 0.5–2.2)
LACTIC ACID: 5.3 MMOL/L (ref 0.5–2.2)
LIPASE: 16 U/L (ref 13–60)
LYMPHOCYTES # BLD: 7 % (ref 24–44)
MAGNESIUM: 1.2 MG/DL (ref 1.6–2.6)
MCH RBC QN AUTO: 31.1 PG (ref 26–34)
MCHC RBC AUTO-ENTMCNC: 34 G/DL (ref 31–37)
MCV RBC AUTO: 91.5 FL (ref 80–100)
MONOCYTES # BLD: 4 % (ref 1–7)
NRBC AUTOMATED: ABNORMAL PER 100 WBC
PDW BLD-RTO: 16.2 % (ref 11.5–14.5)
PLATELET # BLD: 328 K/UL (ref 130–400)
PLATELET ESTIMATE: ABNORMAL
PMV BLD AUTO: 8 FL (ref 6–12)
POTASSIUM SERPL-SCNC: 3.5 MMOL/L (ref 3.7–5.3)
PREGNANCY TEST URINE, POC: NORMAL
RBC # BLD: 4.79 M/UL (ref 4–5.2)
RBC # BLD: ABNORMAL 10*6/UL
SEG NEUTROPHILS: 89 % (ref 36–66)
SEGMENTED NEUTROPHILS ABSOLUTE COUNT: 14.4 K/UL (ref 1.8–7.7)
SODIUM BLD-SCNC: 141 MMOL/L (ref 135–144)
TOTAL PROTEIN: 8.9 G/DL (ref 6.4–8.3)
WBC # BLD: 16.1 K/UL (ref 3.5–11)
WBC # BLD: ABNORMAL 10*3/UL

## 2018-12-01 PROCEDURE — 96372 THER/PROPH/DIAG INJ SC/IM: CPT

## 2018-12-01 PROCEDURE — 96375 TX/PRO/DX INJ NEW DRUG ADDON: CPT

## 2018-12-01 PROCEDURE — 83690 ASSAY OF LIPASE: CPT

## 2018-12-01 PROCEDURE — 96361 HYDRATE IV INFUSION ADD-ON: CPT

## 2018-12-01 PROCEDURE — 84703 CHORIONIC GONADOTROPIN ASSAY: CPT

## 2018-12-01 PROCEDURE — 81003 URINALYSIS AUTO W/O SCOPE: CPT

## 2018-12-01 PROCEDURE — 2580000003 HC RX 258: Performed by: NURSE PRACTITIONER

## 2018-12-01 PROCEDURE — 83735 ASSAY OF MAGNESIUM: CPT

## 2018-12-01 PROCEDURE — 85025 COMPLETE CBC W/AUTO DIFF WBC: CPT

## 2018-12-01 PROCEDURE — 93005 ELECTROCARDIOGRAM TRACING: CPT

## 2018-12-01 PROCEDURE — 96365 THER/PROPH/DIAG IV INF INIT: CPT

## 2018-12-01 PROCEDURE — 99285 EMERGENCY DEPT VISIT HI MDM: CPT

## 2018-12-01 PROCEDURE — 83605 ASSAY OF LACTIC ACID: CPT

## 2018-12-01 PROCEDURE — 80053 COMPREHEN METABOLIC PANEL: CPT

## 2018-12-01 PROCEDURE — 6360000002 HC RX W HCPCS: Performed by: EMERGENCY MEDICINE

## 2018-12-01 PROCEDURE — C9113 INJ PANTOPRAZOLE SODIUM, VIA: HCPCS | Performed by: NURSE PRACTITIONER

## 2018-12-01 PROCEDURE — 6360000002 HC RX W HCPCS: Performed by: NURSE PRACTITIONER

## 2018-12-01 RX ORDER — LORAZEPAM 2 MG/ML
1 INJECTION INTRAMUSCULAR ONCE
Status: COMPLETED | OUTPATIENT
Start: 2018-12-01 | End: 2018-12-01

## 2018-12-01 RX ORDER — 0.9 % SODIUM CHLORIDE 0.9 %
10 VIAL (ML) INJECTION ONCE
Status: COMPLETED | OUTPATIENT
Start: 2018-12-01 | End: 2018-12-01

## 2018-12-01 RX ORDER — PANTOPRAZOLE SODIUM 40 MG/10ML
40 INJECTION, POWDER, LYOPHILIZED, FOR SOLUTION INTRAVENOUS ONCE
Status: COMPLETED | OUTPATIENT
Start: 2018-12-01 | End: 2018-12-01

## 2018-12-01 RX ORDER — PROMETHAZINE HYDROCHLORIDE 25 MG/1
25 TABLET ORAL EVERY 6 HOURS PRN
Qty: 24 TABLET | Refills: 0 | Status: SHIPPED | OUTPATIENT
Start: 2018-12-01 | End: 2018-12-08

## 2018-12-01 RX ORDER — 0.9 % SODIUM CHLORIDE 0.9 %
1000 INTRAVENOUS SOLUTION INTRAVENOUS ONCE
Status: COMPLETED | OUTPATIENT
Start: 2018-12-01 | End: 2018-12-01

## 2018-12-01 RX ORDER — DICYCLOMINE HYDROCHLORIDE 10 MG/ML
20 INJECTION INTRAMUSCULAR ONCE
Status: COMPLETED | OUTPATIENT
Start: 2018-12-01 | End: 2018-12-01

## 2018-12-01 RX ORDER — MAGNESIUM SULFATE 1 G/100ML
1 INJECTION INTRAVENOUS ONCE
Status: COMPLETED | OUTPATIENT
Start: 2018-12-01 | End: 2018-12-01

## 2018-12-01 RX ORDER — PROMETHAZINE HYDROCHLORIDE 25 MG/ML
12.5 INJECTION, SOLUTION INTRAMUSCULAR; INTRAVENOUS ONCE
Status: COMPLETED | OUTPATIENT
Start: 2018-12-01 | End: 2018-12-01

## 2018-12-01 RX ORDER — ONDANSETRON 2 MG/ML
4 INJECTION INTRAMUSCULAR; INTRAVENOUS ONCE
Status: COMPLETED | OUTPATIENT
Start: 2018-12-01 | End: 2018-12-01

## 2018-12-01 RX ADMIN — PROMETHAZINE HYDROCHLORIDE 12.5 MG: 25 INJECTION INTRAMUSCULAR; INTRAVENOUS at 13:28

## 2018-12-01 RX ADMIN — MAGNESIUM SULFATE HEPTAHYDRATE 1 G: 1 INJECTION, SOLUTION INTRAVENOUS at 13:45

## 2018-12-01 RX ADMIN — SODIUM CHLORIDE 1000 ML: 9 INJECTION, SOLUTION INTRAVENOUS at 13:28

## 2018-12-01 RX ADMIN — SODIUM CHLORIDE 1000 ML: 9 INJECTION, SOLUTION INTRAVENOUS at 14:56

## 2018-12-01 RX ADMIN — SODIUM CHLORIDE 1000 ML: 9 INJECTION, SOLUTION INTRAVENOUS at 12:44

## 2018-12-01 RX ADMIN — LORAZEPAM 1 MG: 2 INJECTION, SOLUTION INTRAMUSCULAR; INTRAVENOUS at 14:57

## 2018-12-01 RX ADMIN — PANTOPRAZOLE SODIUM 40 MG: 40 INJECTION, POWDER, FOR SOLUTION INTRAVENOUS at 14:57

## 2018-12-01 RX ADMIN — Medication 10 ML: at 14:58

## 2018-12-01 RX ADMIN — DICYCLOMINE HYDROCHLORIDE 20 MG: 20 INJECTION, SOLUTION INTRAMUSCULAR at 12:44

## 2018-12-01 RX ADMIN — ONDANSETRON 4 MG: 2 INJECTION INTRAMUSCULAR; INTRAVENOUS at 12:44

## 2018-12-01 ASSESSMENT — ENCOUNTER SYMPTOMS
SORE THROAT: 0
SHORTNESS OF BREATH: 0
CONSTIPATION: 0
COLOR CHANGE: 0
ABDOMINAL PAIN: 0
COUGH: 1
EYE PAIN: 0
WHEEZING: 0
NAUSEA: 1
VOMITING: 1
BACK PAIN: 0

## 2018-12-01 ASSESSMENT — PAIN DESCRIPTION - FREQUENCY: FREQUENCY: CONTINUOUS

## 2018-12-01 ASSESSMENT — PAIN DESCRIPTION - DESCRIPTORS: DESCRIPTORS: ACHING;SHARP;TENDER

## 2018-12-01 ASSESSMENT — PAIN DESCRIPTION - LOCATION: LOCATION: ABDOMEN;NECK;THROAT

## 2018-12-01 ASSESSMENT — PAIN SCALES - GENERAL: PAINLEVEL_OUTOF10: 8

## 2018-12-01 ASSESSMENT — PAIN SCALES - WONG BAKER: WONGBAKER_NUMERICALRESPONSE: 8

## 2018-12-01 NOTE — ED NOTES
Pt. C/o vomiting constantly since last night as well as muscular neck pain, generalized abdominal cramping some diarrhea. She presented to Urgent Care, but sent here after elevated BP and heartrate. Slightly anxious upon arrival. Pt. Also c/o feeling a \"pop\" to her upper abdomen after vomiting en route.       Lincoln Minor RN  12/01/18 0658

## 2018-12-03 LAB — HCG, PREGNANCY URINE (POC): NEGATIVE

## 2019-01-12 ENCOUNTER — HOSPITAL ENCOUNTER (EMERGENCY)
Age: 34
Discharge: HOME | End: 2019-01-13
Attending: EMERGENCY MEDICINE
Payer: MEDICARE

## 2019-01-12 VITALS
BODY MASS INDEX: 25.76 KG/M2 | DIASTOLIC BLOOD PRESSURE: 72 MMHG | OXYGEN SATURATION: 95 % | HEART RATE: 115 BPM | SYSTOLIC BLOOD PRESSURE: 128 MMHG | HEIGHT: 68 IN | RESPIRATION RATE: 20 BRPM | WEIGHT: 170 LBS | TEMPERATURE: 98.1 F

## 2019-01-12 DIAGNOSIS — R10.9 ABDOMINAL PAIN, UNSPECIFIED ABDOMINAL LOCATION: Primary | ICD-10-CM

## 2019-01-12 DIAGNOSIS — G43.A0 CYCLICAL VOMITING WITH NAUSEA, INTRACTABILITY OF VOMITING NOT SPECIFIED: ICD-10-CM

## 2019-01-12 LAB
ABSOLUTE EOS #: 0 K/UL (ref 0–0.4)
ABSOLUTE IMMATURE GRANULOCYTE: ABNORMAL K/UL (ref 0–0.3)
ABSOLUTE LYMPH #: 1 K/UL (ref 1–4.8)
ABSOLUTE MONO #: 0.2 K/UL (ref 0.2–0.8)
ALBUMIN SERPL-MCNC: 4.7 G/DL (ref 3.5–5.2)
ALBUMIN/GLOBULIN RATIO: ABNORMAL (ref 1–2.5)
ALP BLD-CCNC: 72 U/L (ref 35–104)
ALT SERPL-CCNC: 104 U/L (ref 5–33)
ANION GAP SERPL CALCULATED.3IONS-SCNC: 16 MMOL/L (ref 9–17)
AST SERPL-CCNC: 137 U/L
BASOPHILS # BLD: 0 % (ref 0–2)
BASOPHILS ABSOLUTE: 0 K/UL (ref 0–0.2)
BILIRUB SERPL-MCNC: 0.34 MG/DL (ref 0.3–1.2)
BILIRUBIN DIRECT: 0.11 MG/DL
BILIRUBIN URINE: NEGATIVE
BILIRUBIN, INDIRECT: 0.23 MG/DL (ref 0–1)
BUN BLDV-MCNC: 3 MG/DL (ref 6–20)
BUN/CREAT BLD: 5 (ref 9–20)
CALCIUM SERPL-MCNC: 9.8 MG/DL (ref 8.6–10.4)
CHLORIDE BLD-SCNC: 96 MMOL/L (ref 98–107)
CHP ED QC CHECK: NORMAL
CO2: 28 MMOL/L (ref 20–31)
COLOR: YELLOW
COMMENT UA: ABNORMAL
CREAT SERPL-MCNC: 0.58 MG/DL (ref 0.5–0.9)
DIFFERENTIAL TYPE: ABNORMAL
EOSINOPHILS RELATIVE PERCENT: 0 % (ref 1–4)
GFR AFRICAN AMERICAN: >60 ML/MIN
GFR NON-AFRICAN AMERICAN: >60 ML/MIN
GFR SERPL CREATININE-BSD FRML MDRD: ABNORMAL ML/MIN/{1.73_M2}
GFR SERPL CREATININE-BSD FRML MDRD: ABNORMAL ML/MIN/{1.73_M2}
GLOBULIN: ABNORMAL G/DL (ref 1.5–3.8)
GLUCOSE BLD-MCNC: 116 MG/DL (ref 70–99)
GLUCOSE URINE: NEGATIVE
HCG QUALITATIVE: NEGATIVE
HCT VFR BLD CALC: 45.4 % (ref 36–46)
HEMOGLOBIN: 15.4 G/DL (ref 12–16)
IMMATURE GRANULOCYTES: ABNORMAL %
KETONES, URINE: ABNORMAL
LEUKOCYTE ESTERASE, URINE: NEGATIVE
LIPASE: 27 U/L (ref 13–60)
LYMPHOCYTES # BLD: 16 % (ref 24–44)
MCH RBC QN AUTO: 31.7 PG (ref 26–34)
MCHC RBC AUTO-ENTMCNC: 33.9 G/DL (ref 31–37)
MCV RBC AUTO: 93.5 FL (ref 80–100)
MONOCYTES # BLD: 4 % (ref 1–7)
NITRITE, URINE: NEGATIVE
NRBC AUTOMATED: ABNORMAL PER 100 WBC
PDW BLD-RTO: 19.2 % (ref 11.5–14.5)
PH UA: 7.5 (ref 5–8)
PLATELET # BLD: 266 K/UL (ref 130–400)
PLATELET ESTIMATE: ABNORMAL
PMV BLD AUTO: 8.4 FL (ref 6–12)
POTASSIUM SERPL-SCNC: 3.5 MMOL/L (ref 3.7–5.3)
PREGNANCY TEST URINE, POC: NORMAL
PROTEIN UA: NEGATIVE
RBC # BLD: 4.85 M/UL (ref 4–5.2)
RBC # BLD: ABNORMAL 10*6/UL
SEG NEUTROPHILS: 80 % (ref 36–66)
SEGMENTED NEUTROPHILS ABSOLUTE COUNT: 5 K/UL (ref 1.8–7.7)
SODIUM BLD-SCNC: 140 MMOL/L (ref 135–144)
SPECIFIC GRAVITY UA: 1.01 (ref 1–1.03)
TOTAL PROTEIN: 8.4 G/DL (ref 6.4–8.3)
TURBIDITY: CLEAR
URINE HGB: NEGATIVE
UROBILINOGEN, URINE: NORMAL
WBC # BLD: 6.3 K/UL (ref 3.5–11)
WBC # BLD: ABNORMAL 10*3/UL

## 2019-01-12 PROCEDURE — 96361 HYDRATE IV INFUSION ADD-ON: CPT

## 2019-01-12 PROCEDURE — 2580000003 HC RX 258: Performed by: NURSE PRACTITIONER

## 2019-01-12 PROCEDURE — 96374 THER/PROPH/DIAG INJ IV PUSH: CPT

## 2019-01-12 PROCEDURE — 80048 BASIC METABOLIC PNL TOTAL CA: CPT

## 2019-01-12 PROCEDURE — 84703 CHORIONIC GONADOTROPIN ASSAY: CPT

## 2019-01-12 PROCEDURE — 6360000002 HC RX W HCPCS: Performed by: NURSE PRACTITIONER

## 2019-01-12 PROCEDURE — 85025 COMPLETE CBC W/AUTO DIFF WBC: CPT

## 2019-01-12 PROCEDURE — 80076 HEPATIC FUNCTION PANEL: CPT

## 2019-01-12 PROCEDURE — 99284 EMERGENCY DEPT VISIT MOD MDM: CPT

## 2019-01-12 PROCEDURE — 83690 ASSAY OF LIPASE: CPT

## 2019-01-12 PROCEDURE — 96375 TX/PRO/DX INJ NEW DRUG ADDON: CPT

## 2019-01-12 RX ORDER — 0.9 % SODIUM CHLORIDE 0.9 %
1000 INTRAVENOUS SOLUTION INTRAVENOUS ONCE
Status: COMPLETED | OUTPATIENT
Start: 2019-01-12 | End: 2019-01-12

## 2019-01-12 RX ORDER — DICYCLOMINE HYDROCHLORIDE 10 MG/1
10 CAPSULE ORAL EVERY 6 HOURS PRN
Qty: 20 CAPSULE | Refills: 0 | Status: SHIPPED | OUTPATIENT
Start: 2019-01-12 | End: 2020-01-04

## 2019-01-12 RX ORDER — METOCLOPRAMIDE HYDROCHLORIDE 5 MG/ML
10 INJECTION INTRAMUSCULAR; INTRAVENOUS ONCE
Status: COMPLETED | OUTPATIENT
Start: 2019-01-12 | End: 2019-01-12

## 2019-01-12 RX ORDER — HALOPERIDOL 5 MG/ML
5 INJECTION INTRAMUSCULAR ONCE
Status: COMPLETED | OUTPATIENT
Start: 2019-01-12 | End: 2019-01-12

## 2019-01-12 RX ORDER — PROMETHAZINE HYDROCHLORIDE 25 MG/1
25 TABLET ORAL EVERY 6 HOURS PRN
COMMUNITY
End: 2020-01-04

## 2019-01-12 RX ORDER — ONDANSETRON 4 MG/1
4 TABLET, ORALLY DISINTEGRATING ORAL EVERY 8 HOURS PRN
Qty: 20 TABLET | Refills: 0 | Status: SHIPPED | OUTPATIENT
Start: 2019-01-12 | End: 2020-01-04

## 2019-01-12 RX ADMIN — METOCLOPRAMIDE 10 MG: 5 INJECTION, SOLUTION INTRAMUSCULAR; INTRAVENOUS at 21:02

## 2019-01-12 RX ADMIN — HALOPERIDOL LACTATE 5 MG: 5 INJECTION INTRAMUSCULAR at 22:27

## 2019-01-12 RX ADMIN — SODIUM CHLORIDE 1000 ML: 9 INJECTION, SOLUTION INTRAVENOUS at 20:58

## 2019-01-12 ASSESSMENT — ENCOUNTER SYMPTOMS
ABDOMINAL PAIN: 1
CONSTIPATION: 0
DIARRHEA: 0
VOMITING: 1
NAUSEA: 1

## 2019-01-12 ASSESSMENT — PAIN SCALES - GENERAL: PAINLEVEL_OUTOF10: 10

## 2019-01-12 ASSESSMENT — PAIN DESCRIPTION - ORIENTATION: ORIENTATION: LOWER

## 2019-01-12 ASSESSMENT — PAIN DESCRIPTION - LOCATION: LOCATION: ABDOMEN

## 2020-01-04 ENCOUNTER — HOSPITAL ENCOUNTER (INPATIENT)
Age: 35
LOS: 2 days | Discharge: HOME | DRG: 058 | End: 2020-01-06
Attending: EMERGENCY MEDICINE | Admitting: INTERNAL MEDICINE
Payer: MEDICARE

## 2020-01-04 ENCOUNTER — APPOINTMENT (OUTPATIENT)
Dept: CT IMAGING | Age: 35
DRG: 058 | End: 2020-01-04
Payer: MEDICARE

## 2020-01-04 ENCOUNTER — APPOINTMENT (OUTPATIENT)
Dept: GENERAL RADIOLOGY | Age: 35
DRG: 058 | End: 2020-01-04
Payer: MEDICARE

## 2020-01-04 PROBLEM — R56.9 SEIZURE (HCC): Status: ACTIVE | Noted: 2020-01-04

## 2020-01-04 LAB
-: ABNORMAL
ABSOLUTE EOS #: 0.12 K/UL (ref 0–0.44)
ABSOLUTE IMMATURE GRANULOCYTE: 0.01 K/UL (ref 0–0.3)
ABSOLUTE LYMPH #: 1.21 K/UL (ref 1.1–3.7)
ABSOLUTE MONO #: 0.39 K/UL (ref 0.1–1.2)
AMORPHOUS: ABNORMAL
AMPHETAMINE SCREEN URINE: NEGATIVE
ANION GAP SERPL CALCULATED.3IONS-SCNC: 22 MMOL/L (ref 9–17)
BACTERIA: ABNORMAL
BARBITURATE SCREEN URINE: NEGATIVE
BASOPHILS # BLD: 1 % (ref 0–2)
BASOPHILS ABSOLUTE: 0.06 K/UL (ref 0–0.2)
BENZODIAZEPINE SCREEN, URINE: NEGATIVE
BILIRUBIN URINE: NEGATIVE
BUN BLDV-MCNC: 4 MG/DL (ref 6–20)
BUN/CREAT BLD: 7 (ref 9–20)
BUPRENORPHINE URINE: NORMAL
CALCIUM SERPL-MCNC: 8.9 MG/DL (ref 8.6–10.4)
CANNABINOID SCREEN URINE: NEGATIVE
CASTS UA: ABNORMAL /LPF
CASTS UA: ABNORMAL /LPF
CHLORIDE BLD-SCNC: 96 MMOL/L (ref 98–107)
CHP ED QC CHECK: NORMAL
CO2: 18 MMOL/L (ref 20–31)
COCAINE METABOLITE, URINE: NEGATIVE
COLOR: YELLOW
COMMENT UA: ABNORMAL
CREAT SERPL-MCNC: 0.59 MG/DL (ref 0.5–0.9)
CRYSTALS, UA: ABNORMAL /HPF
DIFFERENTIAL TYPE: ABNORMAL
EOSINOPHILS RELATIVE PERCENT: 2 % (ref 1–4)
EPITHELIAL CELLS UA: ABNORMAL /HPF (ref 0–5)
ETHANOL PERCENT: 0.02 %
ETHANOL: 16 MG/DL
GFR AFRICAN AMERICAN: >60 ML/MIN
GFR NON-AFRICAN AMERICAN: >60 ML/MIN
GFR SERPL CREATININE-BSD FRML MDRD: ABNORMAL ML/MIN/{1.73_M2}
GFR SERPL CREATININE-BSD FRML MDRD: ABNORMAL ML/MIN/{1.73_M2}
GLUCOSE BLD-MCNC: 84 MG/DL (ref 70–99)
GLUCOSE BLD-MCNC: 91 MG/DL (ref 65–105)
GLUCOSE URINE: NEGATIVE
HCT VFR BLD CALC: 40.6 % (ref 36.3–47.1)
HEMOGLOBIN: 13.5 G/DL (ref 11.9–15.1)
IMMATURE GRANULOCYTES: 0 %
KETONES, URINE: NEGATIVE
LACTIC ACID: 3.1 MMOL/L (ref 0.5–2.2)
LACTIC ACID: 6.3 MMOL/L (ref 0.5–2.2)
LEUKOCYTE ESTERASE, URINE: NEGATIVE
LYMPHOCYTES # BLD: 22 % (ref 24–43)
MAGNESIUM: 1.5 MG/DL (ref 1.6–2.6)
MAGNESIUM: 1.7 MG/DL (ref 1.6–2.6)
MCH RBC QN AUTO: 32.8 PG (ref 25.2–33.5)
MCHC RBC AUTO-ENTMCNC: 33.3 G/DL (ref 28.4–34.8)
MCV RBC AUTO: 98.5 FL (ref 82.6–102.9)
MDMA URINE: NORMAL
METHADONE SCREEN, URINE: NEGATIVE
METHAMPHETAMINE, URINE: NORMAL
MONOCYTES # BLD: 7 % (ref 3–12)
MUCUS: ABNORMAL
NITRITE, URINE: NEGATIVE
NRBC AUTOMATED: 0 PER 100 WBC
OPIATES, URINE: NEGATIVE
OTHER OBSERVATIONS UA: ABNORMAL
OXYCODONE SCREEN URINE: NEGATIVE
PDW BLD-RTO: 15.8 % (ref 11.8–14.4)
PH UA: 5.5 (ref 5–8)
PHENCYCLIDINE, URINE: NEGATIVE
PLATELET # BLD: 204 K/UL (ref 138–453)
PLATELET ESTIMATE: ABNORMAL
PMV BLD AUTO: 10.3 FL (ref 8.1–13.5)
POTASSIUM SERPL-SCNC: 2.9 MMOL/L (ref 3.7–5.3)
POTASSIUM SERPL-SCNC: 3.5 MMOL/L (ref 3.7–5.3)
PREGNANCY TEST URINE, POC: NEGATIVE
PROPOXYPHENE, URINE: NORMAL
PROTEIN UA: ABNORMAL
RBC # BLD: 4.12 M/UL (ref 3.95–5.11)
RBC # BLD: ABNORMAL 10*6/UL
RBC UA: ABNORMAL /HPF (ref 0–2)
RENAL EPITHELIAL, UA: ABNORMAL /HPF
SEG NEUTROPHILS: 68 % (ref 36–65)
SEGMENTED NEUTROPHILS ABSOLUTE COUNT: 3.76 K/UL (ref 1.5–8.1)
SODIUM BLD-SCNC: 136 MMOL/L (ref 135–144)
SPECIFIC GRAVITY UA: 1.02 (ref 1–1.03)
TEST INFORMATION: NORMAL
TOTAL CK: 57 U/L (ref 26–192)
TRICHOMONAS: ABNORMAL
TRICYCLIC ANTIDEPRESSANTS, UR: NORMAL
TURBIDITY: ABNORMAL
URINE HGB: ABNORMAL
UROBILINOGEN, URINE: NORMAL
WBC # BLD: 5.6 K/UL (ref 3.5–11.3)
WBC # BLD: ABNORMAL 10*3/UL
WBC UA: ABNORMAL /HPF (ref 0–5)
YEAST: ABNORMAL

## 2020-01-04 PROCEDURE — 82947 ASSAY GLUCOSE BLOOD QUANT: CPT

## 2020-01-04 PROCEDURE — 96365 THER/PROPH/DIAG IV INF INIT: CPT

## 2020-01-04 PROCEDURE — 81001 URINALYSIS AUTO W/SCOPE: CPT

## 2020-01-04 PROCEDURE — 82550 ASSAY OF CK (CPK): CPT

## 2020-01-04 PROCEDURE — 2580000003 HC RX 258: Performed by: EMERGENCY MEDICINE

## 2020-01-04 PROCEDURE — 72125 CT NECK SPINE W/O DYE: CPT

## 2020-01-04 PROCEDURE — 99222 1ST HOSP IP/OBS MODERATE 55: CPT | Performed by: NURSE PRACTITIONER

## 2020-01-04 PROCEDURE — 6370000000 HC RX 637 (ALT 250 FOR IP): Performed by: EMERGENCY MEDICINE

## 2020-01-04 PROCEDURE — 87449 NOS EACH ORGANISM AG IA: CPT

## 2020-01-04 PROCEDURE — 81025 URINE PREGNANCY TEST: CPT

## 2020-01-04 PROCEDURE — 93005 ELECTROCARDIOGRAM TRACING: CPT | Performed by: EMERGENCY MEDICINE

## 2020-01-04 PROCEDURE — 80048 BASIC METABOLIC PNL TOTAL CA: CPT

## 2020-01-04 PROCEDURE — 70450 CT HEAD/BRAIN W/O DYE: CPT

## 2020-01-04 PROCEDURE — 85025 COMPLETE CBC W/AUTO DIFF WBC: CPT

## 2020-01-04 PROCEDURE — 96375 TX/PRO/DX INJ NEW DRUG ADDON: CPT

## 2020-01-04 PROCEDURE — 2580000003 HC RX 258: Performed by: NURSE PRACTITIONER

## 2020-01-04 PROCEDURE — 6360000002 HC RX W HCPCS: Performed by: EMERGENCY MEDICINE

## 2020-01-04 PROCEDURE — 83735 ASSAY OF MAGNESIUM: CPT

## 2020-01-04 PROCEDURE — 87324 CLOSTRIDIUM AG IA: CPT

## 2020-01-04 PROCEDURE — 83605 ASSAY OF LACTIC ACID: CPT

## 2020-01-04 PROCEDURE — 99291 CRITICAL CARE FIRST HOUR: CPT

## 2020-01-04 PROCEDURE — 84132 ASSAY OF SERUM POTASSIUM: CPT

## 2020-01-04 PROCEDURE — 96361 HYDRATE IV INFUSION ADD-ON: CPT

## 2020-01-04 PROCEDURE — 80307 DRUG TEST PRSMV CHEM ANLYZR: CPT

## 2020-01-04 PROCEDURE — 2060000000 HC ICU INTERMEDIATE R&B

## 2020-01-04 PROCEDURE — G0480 DRUG TEST DEF 1-7 CLASSES: HCPCS

## 2020-01-04 PROCEDURE — 71045 X-RAY EXAM CHEST 1 VIEW: CPT

## 2020-01-04 PROCEDURE — 36415 COLL VENOUS BLD VENIPUNCTURE: CPT

## 2020-01-04 RX ORDER — LORAZEPAM 2 MG/ML
4 INJECTION INTRAMUSCULAR
Status: DISCONTINUED | OUTPATIENT
Start: 2020-01-04 | End: 2020-01-06 | Stop reason: HOSPADM

## 2020-01-04 RX ORDER — LORATADINE 10 MG/1
10 TABLET ORAL DAILY
Status: ON HOLD | COMMUNITY
End: 2022-05-13

## 2020-01-04 RX ORDER — THIAMINE MONONITRATE (VIT B1) 100 MG
100 TABLET ORAL DAILY
Status: DISCONTINUED | OUTPATIENT
Start: 2020-01-05 | End: 2020-01-06 | Stop reason: HOSPADM

## 2020-01-04 RX ORDER — SODIUM CHLORIDE 9 MG/ML
INJECTION, SOLUTION INTRAVENOUS CONTINUOUS
Status: DISCONTINUED | OUTPATIENT
Start: 2020-01-04 | End: 2020-01-06 | Stop reason: HOSPADM

## 2020-01-04 RX ORDER — ONDANSETRON 4 MG/1
4 TABLET, ORALLY DISINTEGRATING ORAL EVERY 6 HOURS PRN
Status: DISCONTINUED | OUTPATIENT
Start: 2020-01-04 | End: 2020-01-06 | Stop reason: HOSPADM

## 2020-01-04 RX ORDER — POTASSIUM CHLORIDE 7.45 MG/ML
10 INJECTION INTRAVENOUS PRN
Status: DISCONTINUED | OUTPATIENT
Start: 2020-01-04 | End: 2020-01-06 | Stop reason: HOSPADM

## 2020-01-04 RX ORDER — ACETAMINOPHEN 325 MG/1
650 TABLET ORAL EVERY 4 HOURS PRN
Status: DISCONTINUED | OUTPATIENT
Start: 2020-01-04 | End: 2020-01-06 | Stop reason: HOSPADM

## 2020-01-04 RX ORDER — ONDANSETRON 2 MG/ML
4 INJECTION INTRAMUSCULAR; INTRAVENOUS ONCE
Status: COMPLETED | OUTPATIENT
Start: 2020-01-04 | End: 2020-01-04

## 2020-01-04 RX ORDER — MAGNESIUM SULFATE 1 G/100ML
1 INJECTION INTRAVENOUS PRN
Status: DISCONTINUED | OUTPATIENT
Start: 2020-01-04 | End: 2020-01-06 | Stop reason: HOSPADM

## 2020-01-04 RX ORDER — LORAZEPAM 1 MG/1
4 TABLET ORAL
Status: DISCONTINUED | OUTPATIENT
Start: 2020-01-04 | End: 2020-01-06 | Stop reason: HOSPADM

## 2020-01-04 RX ORDER — LORAZEPAM 2 MG/ML
2 INJECTION INTRAMUSCULAR
Status: DISCONTINUED | OUTPATIENT
Start: 2020-01-04 | End: 2020-01-06 | Stop reason: HOSPADM

## 2020-01-04 RX ORDER — 0.9 % SODIUM CHLORIDE 0.9 %
1000 INTRAVENOUS SOLUTION INTRAVENOUS ONCE
Status: COMPLETED | OUTPATIENT
Start: 2020-01-04 | End: 2020-01-04

## 2020-01-04 RX ORDER — FOLIC ACID 1 MG/1
1 TABLET ORAL DAILY
Status: DISCONTINUED | OUTPATIENT
Start: 2020-01-05 | End: 2020-01-06 | Stop reason: HOSPADM

## 2020-01-04 RX ORDER — POTASSIUM CHLORIDE 20 MEQ/1
40 TABLET, EXTENDED RELEASE ORAL ONCE
Status: COMPLETED | OUTPATIENT
Start: 2020-01-04 | End: 2020-01-04

## 2020-01-04 RX ORDER — LORAZEPAM 1 MG/1
1 TABLET ORAL
Status: DISCONTINUED | OUTPATIENT
Start: 2020-01-04 | End: 2020-01-06 | Stop reason: HOSPADM

## 2020-01-04 RX ORDER — NICOTINE 21 MG/24HR
1 PATCH, TRANSDERMAL 24 HOURS TRANSDERMAL DAILY PRN
Status: DISCONTINUED | OUTPATIENT
Start: 2020-01-04 | End: 2020-01-06 | Stop reason: HOSPADM

## 2020-01-04 RX ORDER — ONDANSETRON 2 MG/ML
4 INJECTION INTRAMUSCULAR; INTRAVENOUS EVERY 6 HOURS PRN
Status: DISCONTINUED | OUTPATIENT
Start: 2020-01-04 | End: 2020-01-04

## 2020-01-04 RX ORDER — LORAZEPAM 1 MG/1
2 TABLET ORAL
Status: DISCONTINUED | OUTPATIENT
Start: 2020-01-04 | End: 2020-01-06 | Stop reason: HOSPADM

## 2020-01-04 RX ORDER — MULTIVITAMIN WITH FOLIC ACID 400 MCG
1 TABLET ORAL DAILY
Status: DISCONTINUED | OUTPATIENT
Start: 2020-01-05 | End: 2020-01-06 | Stop reason: HOSPADM

## 2020-01-04 RX ORDER — MAGNESIUM SULFATE 1 G/100ML
1 INJECTION INTRAVENOUS
Status: DISPENSED | OUTPATIENT
Start: 2020-01-04 | End: 2020-01-04

## 2020-01-04 RX ORDER — LORAZEPAM 1 MG/1
3 TABLET ORAL
Status: DISCONTINUED | OUTPATIENT
Start: 2020-01-04 | End: 2020-01-06 | Stop reason: HOSPADM

## 2020-01-04 RX ORDER — METOCLOPRAMIDE 10 MG/1
10 TABLET ORAL 4 TIMES DAILY PRN
Status: ON HOLD | COMMUNITY
End: 2020-01-06 | Stop reason: HOSPADM

## 2020-01-04 RX ORDER — SODIUM CHLORIDE 0.9 % (FLUSH) 0.9 %
10 SYRINGE (ML) INJECTION EVERY 12 HOURS SCHEDULED
Status: DISCONTINUED | OUTPATIENT
Start: 2020-01-04 | End: 2020-01-06 | Stop reason: HOSPADM

## 2020-01-04 RX ORDER — LORAZEPAM 2 MG/ML
1 INJECTION INTRAMUSCULAR
Status: DISCONTINUED | OUTPATIENT
Start: 2020-01-04 | End: 2020-01-06 | Stop reason: HOSPADM

## 2020-01-04 RX ORDER — POTASSIUM CHLORIDE 20 MEQ/1
40 TABLET, EXTENDED RELEASE ORAL PRN
Status: DISCONTINUED | OUTPATIENT
Start: 2020-01-04 | End: 2020-01-06 | Stop reason: HOSPADM

## 2020-01-04 RX ORDER — ONDANSETRON 2 MG/ML
4 INJECTION INTRAMUSCULAR; INTRAVENOUS EVERY 6 HOURS PRN
Status: DISCONTINUED | OUTPATIENT
Start: 2020-01-04 | End: 2020-01-06 | Stop reason: HOSPADM

## 2020-01-04 RX ORDER — LORAZEPAM 2 MG/ML
2 INJECTION INTRAMUSCULAR EVERY 4 HOURS PRN
Status: DISCONTINUED | OUTPATIENT
Start: 2020-01-04 | End: 2020-01-06 | Stop reason: HOSPADM

## 2020-01-04 RX ORDER — SODIUM CHLORIDE 0.9 % (FLUSH) 0.9 %
10 SYRINGE (ML) INJECTION PRN
Status: DISCONTINUED | OUTPATIENT
Start: 2020-01-04 | End: 2020-01-06 | Stop reason: HOSPADM

## 2020-01-04 RX ORDER — LORAZEPAM 2 MG/ML
3 INJECTION INTRAMUSCULAR
Status: DISCONTINUED | OUTPATIENT
Start: 2020-01-04 | End: 2020-01-06 | Stop reason: HOSPADM

## 2020-01-04 RX ORDER — POTASSIUM CHLORIDE 7.45 MG/ML
10 INJECTION INTRAVENOUS ONCE
Status: COMPLETED | OUTPATIENT
Start: 2020-01-04 | End: 2020-01-04

## 2020-01-04 RX ADMIN — ONDANSETRON 4 MG: 2 INJECTION INTRAMUSCULAR; INTRAVENOUS at 17:50

## 2020-01-04 RX ADMIN — POTASSIUM CHLORIDE 40 MEQ: 20 TABLET, EXTENDED RELEASE ORAL at 18:52

## 2020-01-04 RX ADMIN — SODIUM CHLORIDE 1000 ML: 9 INJECTION, SOLUTION INTRAVENOUS at 17:50

## 2020-01-04 RX ADMIN — SODIUM CHLORIDE 1000 ML: 9 INJECTION, SOLUTION INTRAVENOUS at 18:57

## 2020-01-04 RX ADMIN — Medication 10 ML: at 22:54

## 2020-01-04 RX ADMIN — MAGNESIUM SULFATE HEPTAHYDRATE 1 G: 1 INJECTION, SOLUTION INTRAVENOUS at 18:56

## 2020-01-04 RX ADMIN — POTASSIUM CHLORIDE 10 MEQ: 7.46 INJECTION, SOLUTION INTRAVENOUS at 19:40

## 2020-01-04 RX ADMIN — SODIUM CHLORIDE: 9 INJECTION, SOLUTION INTRAVENOUS at 22:52

## 2020-01-04 ASSESSMENT — ENCOUNTER SYMPTOMS
NAUSEA: 0
COLOR CHANGE: 0
FACIAL SWELLING: 0
ABDOMINAL DISTENTION: 0
CHEST TIGHTNESS: 0
DIARRHEA: 1
WHEEZING: 0
EYE PAIN: 0
VOMITING: 0
EYE DISCHARGE: 0
CONSTIPATION: 0
COUGH: 0
BLOOD IN STOOL: 0
SHORTNESS OF BREATH: 0
ABDOMINAL PAIN: 0
BACK PAIN: 0

## 2020-01-04 NOTE — ED PROVIDER NOTES
[facility] disease     HPV (human papilloma virus) anogenital infection      SURGICAL HISTORY       Past Surgical History:   Procedure Laterality Date    GYNECOLOGIC CRYOSURGERY      LEEP      x2     CURRENT MEDICATIONS       Current Discharge Medication List      CONTINUE these medications which have NOT CHANGED    Details   metoclopramide (REGLAN) 10 MG tablet Take 10 mg by mouth 4 times daily as needed (nausea)      loratadine (CLARITIN) 10 MG tablet Take 10 mg by mouth daily           ALLERGIES     is allergic to seasonal.  FAMILY HISTORY     She indicated that her mother is alive. She indicated that her father is alive. SOCIAL HISTORY       Social History     Tobacco Use    Smoking status: Current Every Day Smoker     Packs/day: 0.50     Types: Cigarettes    Smokeless tobacco: Never Used   Substance Use Topics    Alcohol use: Yes     Alcohol/week: 24.0 standard drinks     Types: 24 Cans of beer per week     Comment: social    Drug use: No     PHYSICAL EXAM     INITIAL VITALS: /75   Pulse 67   Temp 98.1 °F (36.7 °C) (Oral)   Resp 18   Ht 5' 4\" (1.626 m)   Wt 170 lb (77.1 kg)   LMP 12/29/2019   SpO2 98%   BMI 29.18 kg/m²    Physical Exam  Vitals signs and nursing note reviewed. Constitutional:       General: She is not in acute distress. Appearance: She is well-developed. She is not diaphoretic. HENT:      Head: Normocephalic and atraumatic. Comments: Left lateral distal tip of the tongue superficial abrasion not actively bleeding. No dental malocclusion. Uvula midline. No sublingual edema. Eyes:      Pupils: Pupils are equal, round, and reactive to light. Neck:      Musculoskeletal: Normal range of motion and neck supple. Cardiovascular:      Rate and Rhythm: Normal rate and regular rhythm. Pulmonary:      Effort: Pulmonary effort is normal.      Breath sounds: Normal breath sounds. Abdominal:      General: Bowel sounds are normal.      Palpations: Abdomen is soft. normal limits   MAGNESIUM - Abnormal; Notable for the following components:    Magnesium 1.5 (*)     All other components within normal limits   LACTIC ACID - Abnormal; Notable for the following components:    Lactic Acid 3.1 (*)     All other components within normal limits   COMPREHENSIVE METABOLIC PANEL W/ REFLEX TO MG FOR LOW K - Abnormal; Notable for the following components:    BUN 2 (*)     CREATININE 0.48 (*)     Bun/Cre Ratio 4 (*)     Calcium 8.0 (*)     ALT 61 (*)      (*)     Total Protein 5.4 (*)     Alb 3.3 (*)     All other components within normal limits   CBC - Abnormal; Notable for the following components:    RBC 3.53 (*)     Hemoglobin 11.6 (*)     Hematocrit 35.3 (*)     RDW 16.0 (*)     All other components within normal limits   PROTIME-INR - Abnormal; Notable for the following components:    Protime 11.9 (*)     All other components within normal limits   POTASSIUM - Abnormal; Notable for the following components:    Potassium 3.5 (*)     All other components within normal limits   POCT URINE PREGNANCY - Normal   C DIFF TOXIN/ANTIGEN   CK   URINE DRUG SCREEN   LACTIC ACID   MAGNESIUM   POTASSIUM   POTASSIUM   POC GLUCOSE FINGERSTICK       EMERGENCY DEPARTMENTCOURSE:         Vitals:    Vitals:    01/04/20 2135 01/05/20 0109 01/05/20 0453 01/05/20 0809   BP: 101/67 115/70 101/72 116/75   Pulse: 108 98 76 67   Resp: 18 18 18 18   Temp: 98.4 °F (36.9 °C) 98.6 °F (37 °C) 97.9 °F (36.6 °C) 98.1 °F (36.7 °C)   TempSrc: Oral Oral Oral Oral   SpO2: 97% 95% 97% 98%   Weight:       Height:           The patient was given the following medications while in the emergency department:  Orders Placed This Encounter   Medications    0.9 % sodium chloride bolus    ondansetron (ZOFRAN) injection 4 mg    magnesium sulfate 1 g in dextrose 5% 100 mL IVPB    potassium chloride (KLOR-CON M) extended release tablet 40 mEq    potassium chloride 10 mEq/100 mL IVPB (Peripheral Line)    0.9 % sodium chloride bolus    0.9 % sodium chloride infusion    sodium chloride flush 0.9 % injection 10 mL    sodium chloride flush 0.9 % injection 10 mL    magnesium hydroxide (MILK OF MAGNESIA) 400 MG/5ML suspension 30 mL    DISCONTD: ondansetron (ZOFRAN) injection 4 mg    nicotine (NICODERM CQ) 21 MG/24HR 1 patch    enoxaparin (LOVENOX) injection 40 mg    acetaminophen (TYLENOL) tablet 650 mg    vitamin B-1 (THIAMINE) tablet 047 mg    folic acid (FOLVITE) tablet 1 mg    multivitamin 1 tablet    LORazepam (ATIVAN) injection 2 mg    OR Linked Order Group     LORazepam (ATIVAN) tablet 1 mg     LORazepam (ATIVAN) injection 1 mg     LORazepam (ATIVAN) tablet 2 mg     LORazepam (ATIVAN) injection 2 mg     LORazepam (ATIVAN) tablet 3 mg     LORazepam (ATIVAN) injection 3 mg     LORazepam (ATIVAN) tablet 4 mg     LORazepam (ATIVAN) injection 4 mg    OR Linked Order Group     potassium chloride (KLOR-CON M) extended release tablet 40 mEq     potassium bicarb-citric acid (EFFER-K) effervescent tablet 40 mEq     potassium chloride 10 mEq/100 mL IVPB (Peripheral Line)    magnesium sulfate 1 g in dextrose 5% 100 mL IVPB    DISCONTD: milk and molasses enema 240 mL    OR Linked Order Group     ondansetron (ZOFRAN-ODT) disintegrating tablet 4 mg     ondansetron (ZOFRAN) injection 4 mg     CONSULTS:  IP CONSULT TO INTERNAL MEDICINE  IP CONSULT TO SPIRITUAL SERVICES    FINAL IMPRESSION      1. Hypokalemia    2. Hypomagnesemia    3. Syncope and collapse    4. Observed seizure-like activity ([address]    5.  Lactic acidosis          DISPOSITION/PLAN   DISPOSITION        PATIENT REFERRED TO:  Carlos Shaw MD  [facility] [address]  [place] [phone]          DISCHARGE MEDICATIONS:  Current Discharge Medication List        Dulce Levin MD  Attending Emergency Physician    This note was created with the assistance of a speech-recognition program. While intending to generate a document that actually reflects the content of the visit, no guarantees can be provided that every mistake has been identified and corrected by editing.                     Eric Ibarra MD  66/65/70 [address], [place]  55/77/69 3863

## 2020-01-04 NOTE — ED PROVIDER NOTES
disease     HPV (human papilloma virus) anogenital infection      SURGICAL HISTORY       Past Surgical History:   Procedure Laterality Date    GYNECOLOGIC CRYOSURGERY      LEEP      x2     CURRENT MEDICATIONS       Current Discharge Medication List      CONTINUE these medications which have NOT CHANGED    Details   metoclopramide (REGLAN) 10 MG tablet Take 10 mg by mouth 4 times daily as needed (nausea)      loratadine (CLARITIN) 10 MG tablet Take 10 mg by mouth daily           ALLERGIES     is allergic to seasonal.  FAMILY HISTORY     She indicated that her mother is alive. She indicated that her father is alive. SOCIAL HISTORY       Social History     Tobacco Use    Smoking status: Current Every Day Smoker     Packs/day: 0.50     Types: Cigarettes    Smokeless tobacco: Never Used   Substance Use Topics    Alcohol use: Yes     Alcohol/week: 24.0 standard drinks     Types: 24 Cans of beer per week     Comment: social    Drug use: No     PHYSICAL EXAM     INITIAL VITALS: /75   Pulse 67   Temp 98.1 °F (36.7 °C) (Oral)   Resp 18   Ht 5' 4\" (1.626 m)   Wt 170 lb (77.1 kg)   LMP 12/29/2019   SpO2 98%   BMI 29.18 kg/m²    Physical Exam  Vitals signs and nursing note reviewed. Constitutional:       General: She is not in acute distress. Appearance: She is well-developed. She is not diaphoretic. HENT:      Head: Normocephalic and atraumatic. Comments: Left lateral distal tip of the tongue superficial abrasion not actively bleeding. No dental malocclusion. Uvula midline. No sublingual edema. Eyes:      Pupils: Pupils are equal, round, and reactive to light. Neck:      Musculoskeletal: Normal range of motion and neck supple. Cardiovascular:      Rate and Rhythm: Normal rate and regular rhythm. Pulmonary:      Effort: Pulmonary effort is normal.      Breath sounds: Normal breath sounds. Abdominal:      General: Bowel sounds are normal.      Palpations: Abdomen is soft. Musculoskeletal: Normal range of motion. Skin:     General: Skin is warm. Capillary Refill: Capillary refill takes less than 2 seconds. Neurological:      Mental Status: She is alert and oriented to person, place, and time. MEDICAL DECISION MAKING:   Patient was seen and examined. Patient is a 59-year-old female who presented to the emergency department secondary to seizure-like activity. On arrival to emergency department patient was alert able to answer questions appropriately no slurred speech no focal neuro deficits. She is placed in seizure precautions. Labs obtained. Patient received a 1 L fluid bolus, she underwent a CT head and neck and chest x-ray, patient will be reevaluated. Laboratory analysis reveals significantly decrease magnesium and potassium which were replaced. Lactic acid also 6.3 patient received 1 L fluid bolus. CT head and neck pending at this time, patient will be endorsed to Dr. Qi Covarrubias ending CT report, please see his note for final ED disposition         All patient's question's and concerns were answered prior to disposition and patient and/or family expressed understanding and agreement of treatment plan. CRITICAL CARE:   CRITICAL CARE TIME     Due to the high probability of sudden and clinically significant deterioration in the patient's condition she required highest level of my preparedness to intervene urgently. I provided critical care time including documentation time, medication orders and management, reevaluation, vital sign assessment, ordering and reviewing of of lab tests ordering and reviewing of x-ray studies, and admission orders. Aggregate critical care time is between 45 minutes including only time during which I was engaged in work directly related to her care and did not include time spent treating other patients simultaneously.                NIH STROKE SCALE:            PROCEDURES:    Procedures    DIAGNOSTIC RESULTS   EKG:All EKG's are interpreted by the Emergency Department Physician who either signs or Co-signs this chart in the absence of a cardiologist.        RADIOLOGY:All plain film, CT, MRI, and formal ultrasound images (except ED bedside ultrasound) are read by the radiologist, see reports below, unless otherwisenoted in MDM or here. CT Cervical Spine WO Contrast   Final Result   No acute intracranial abnormality. Chronic encephalomalacia left anterior   frontal lobe. No acute fracture or traumatic malalignment of the cervical spine. CT Head WO Contrast   Final Result   No acute intracranial abnormality. Chronic encephalomalacia left anterior   frontal lobe. No acute fracture or traumatic malalignment of the cervical spine. XR CHEST PORTABLE   Final Result   No acute cardiopulmonary abnormality. MRI brain without contrast    (Results Pending)     LABS: All lab results were reviewed by myself, and all abnormals are listed below.   Labs Reviewed   CBC WITH AUTO DIFFERENTIAL - Abnormal; Notable for the following components:       Result Value    RDW 15.8 (*)     Seg Neutrophils 68 (*)     Lymphocytes 22 (*)     All other components within normal limits   BASIC METABOLIC PANEL W/ REFLEX TO MG FOR LOW K - Abnormal; Notable for the following components:    BUN 4 (*)     Bun/Cre Ratio 7 (*)     Potassium 2.9 (*)     Chloride 96 (*)     CO2 18 (*)     Anion Gap 22 (*)     All other components within normal limits   LACTIC ACID - Abnormal; Notable for the following components:    Lactic Acid 6.3 (*)     All other components within normal limits   URINALYSIS WITH MICROSCOPIC - Abnormal; Notable for the following components:    Turbidity UA SLIGHTLY CLOUDY (*)     Urine Hgb TRACE (*)     Protein, UA 2+ (*)     Mucus, UA 2+ (*)     All other components within normal limits   ETHANOL - Abnormal; Notable for the following components:    Ethanol 16 (*)     Ethanol percent 0.016 (*)     All other components within bolus    0.9 % sodium chloride infusion    sodium chloride flush 0.9 % injection 10 mL    sodium chloride flush 0.9 % injection 10 mL    magnesium hydroxide (MILK OF MAGNESIA) 400 MG/5ML suspension 30 mL    DISCONTD: ondansetron (ZOFRAN) injection 4 mg    nicotine (NICODERM CQ) 21 MG/24HR 1 patch    enoxaparin (LOVENOX) injection 40 mg    acetaminophen (TYLENOL) tablet 650 mg    vitamin B-1 (THIAMINE) tablet 711 mg    folic acid (FOLVITE) tablet 1 mg    multivitamin 1 tablet    LORazepam (ATIVAN) injection 2 mg    OR Linked Order Group     LORazepam (ATIVAN) tablet 1 mg     LORazepam (ATIVAN) injection 1 mg     LORazepam (ATIVAN) tablet 2 mg     LORazepam (ATIVAN) injection 2 mg     LORazepam (ATIVAN) tablet 3 mg     LORazepam (ATIVAN) injection 3 mg     LORazepam (ATIVAN) tablet 4 mg     LORazepam (ATIVAN) injection 4 mg    OR Linked Order Group     potassium chloride (KLOR-CON M) extended release tablet 40 mEq     potassium bicarb-citric acid (EFFER-K) effervescent tablet 40 mEq     potassium chloride 10 mEq/100 mL IVPB (Peripheral Line)    magnesium sulfate 1 g in dextrose 5% 100 mL IVPB    DISCONTD: milk and molasses enema 240 mL    OR Linked Order Group     ondansetron (ZOFRAN-ODT) disintegrating tablet 4 mg     ondansetron (ZOFRAN) injection 4 mg     CONSULTS:  IP CONSULT TO INTERNAL MEDICINE  IP CONSULT TO SPIRITUAL SERVICES    FINAL IMPRESSION      1. Hypokalemia    2. Hypomagnesemia    3. Syncope and collapse    4.  Observed seizure-like activity [facility])          DISPOSITION/PLAN   DISPOSITION        PATIENT REFERRED TO:  Aleida Patterson MD  [facility] [address]  [place] [phone]          DISCHARGE MEDICATIONS:  Current Discharge Medication List        Adele Sweeney MD  Attending Emergency Physician    This note was created with the assistance of a speech-recognition program. While intending to generate a document that actually reflects the content of the visit, no guarantees can be provided that every mistake has been identified and corrected by editing.                     Aleksey Elder MD  56/47/45 [address], [place]  70/10/17 5434

## 2020-01-04 NOTE — LETTER
[place] [id number]  Phone: [phone]             January 6, 2020    Patient: Chela Michel   YOB: 1985   Date of Visit: 1/4/2020       To Whom It May Concern:    Chela Michel was admitted and treated on our unit from 1/4/2020 to 1/6/2020 and discharged with no work restrictions. Pt is not to drive for 6 months.        Sincerely,       Celestine Srinivasan RN         Signature:__________________________________

## 2020-01-04 NOTE — ED NOTES
Bed: 21  Expected date: 1/4/20  Expected time: 4:52 PM  Means of arrival:   Comments:  [id number], [facility]  01/04/20 7111

## 2020-01-05 LAB
ALBUMIN SERPL-MCNC: 3.3 G/DL (ref 3.5–5.2)
ALBUMIN/GLOBULIN RATIO: ABNORMAL (ref 1–2.5)
ALP BLD-CCNC: 84 U/L (ref 35–104)
ALT SERPL-CCNC: 61 U/L (ref 5–33)
ANION GAP SERPL CALCULATED.3IONS-SCNC: 10 MMOL/L (ref 9–17)
AST SERPL-CCNC: 161 U/L
BILIRUB SERPL-MCNC: 0.92 MG/DL (ref 0.3–1.2)
BUN BLDV-MCNC: 2 MG/DL (ref 6–20)
BUN/CREAT BLD: 4 (ref 9–20)
CALCIUM SERPL-MCNC: 8 MG/DL (ref 8.6–10.4)
CHLORIDE BLD-SCNC: 107 MMOL/L (ref 98–107)
CO2: 22 MMOL/L (ref 20–31)
CREAT SERPL-MCNC: 0.48 MG/DL (ref 0.5–0.9)
EKG ATRIAL RATE: 113 BPM
EKG P AXIS: 50 DEGREES
EKG P-R INTERVAL: 156 MS
EKG Q-T INTERVAL: 356 MS
EKG QRS DURATION: 82 MS
EKG QTC CALCULATION (BAZETT): 488 MS
EKG R AXIS: 44 DEGREES
EKG T AXIS: 20 DEGREES
EKG VENTRICULAR RATE: 113 BPM
FOLATE: >20 NG/ML
GFR AFRICAN AMERICAN: >60 ML/MIN
GFR NON-AFRICAN AMERICAN: >60 ML/MIN
GFR SERPL CREATININE-BSD FRML MDRD: ABNORMAL ML/MIN/{1.73_M2}
GFR SERPL CREATININE-BSD FRML MDRD: ABNORMAL ML/MIN/{1.73_M2}
GLUCOSE BLD-MCNC: 92 MG/DL (ref 70–99)
HCG, PREGNANCY URINE (POC): NEGATIVE
HCT VFR BLD CALC: 35.3 % (ref 36.3–47.1)
HEMOGLOBIN: 11.6 G/DL (ref 11.9–15.1)
INR BLD: 1.2
LACTIC ACID: 1 MMOL/L (ref 0.5–2.2)
MCH RBC QN AUTO: 32.9 PG (ref 25.2–33.5)
MCHC RBC AUTO-ENTMCNC: 32.9 G/DL (ref 28.4–34.8)
MCV RBC AUTO: 100 FL (ref 82.6–102.9)
NRBC AUTOMATED: 0 PER 100 WBC
PDW BLD-RTO: 16 % (ref 11.8–14.4)
PLATELET # BLD: 167 K/UL (ref 138–453)
PMV BLD AUTO: 10.6 FL (ref 8.1–13.5)
POTASSIUM SERPL-SCNC: 3.9 MMOL/L (ref 3.7–5.3)
POTASSIUM SERPL-SCNC: 4.4 MMOL/L (ref 3.7–5.3)
PROTHROMBIN TIME: 11.9 SEC (ref 9.7–11.6)
RBC # BLD: 3.53 M/UL (ref 3.95–5.11)
SODIUM BLD-SCNC: 139 MMOL/L (ref 135–144)
TOTAL PROTEIN: 5.4 G/DL (ref 6.4–8.3)
VITAMIN B-12: 337 PG/ML (ref 232–1245)
VITAMIN D 25-HYDROXY: 14.8 NG/ML (ref 30–100)
WBC # BLD: 5.4 K/UL (ref 3.5–11.3)

## 2020-01-05 PROCEDURE — 80053 COMPREHEN METABOLIC PANEL: CPT

## 2020-01-05 PROCEDURE — 84132 ASSAY OF SERUM POTASSIUM: CPT

## 2020-01-05 PROCEDURE — 82607 VITAMIN B-12: CPT

## 2020-01-05 PROCEDURE — 83605 ASSAY OF LACTIC ACID: CPT

## 2020-01-05 PROCEDURE — 82306 VITAMIN D 25 HYDROXY: CPT

## 2020-01-05 PROCEDURE — 36415 COLL VENOUS BLD VENIPUNCTURE: CPT

## 2020-01-05 PROCEDURE — 6360000002 HC RX W HCPCS: Performed by: NURSE PRACTITIONER

## 2020-01-05 PROCEDURE — 99232 SBSQ HOSP IP/OBS MODERATE 35: CPT | Performed by: INTERNAL MEDICINE

## 2020-01-05 PROCEDURE — 6370000000 HC RX 637 (ALT 250 FOR IP): Performed by: NURSE PRACTITIONER

## 2020-01-05 PROCEDURE — 2060000000 HC ICU INTERMEDIATE R&B

## 2020-01-05 PROCEDURE — 2580000003 HC RX 258: Performed by: NURSE PRACTITIONER

## 2020-01-05 PROCEDURE — 87493 C DIFF AMPLIFIED PROBE: CPT

## 2020-01-05 PROCEDURE — 82746 ASSAY OF FOLIC ACID SERUM: CPT

## 2020-01-05 PROCEDURE — 85610 PROTHROMBIN TIME: CPT

## 2020-01-05 PROCEDURE — 99223 1ST HOSP IP/OBS HIGH 75: CPT | Performed by: PSYCHIATRY & NEUROLOGY

## 2020-01-05 PROCEDURE — 85027 COMPLETE CBC AUTOMATED: CPT

## 2020-01-05 RX ADMIN — MULTIVITAMIN TABLET 1 TABLET: TABLET at 08:33

## 2020-01-05 RX ADMIN — ENOXAPARIN SODIUM 40 MG: 40 INJECTION SUBCUTANEOUS at 08:33

## 2020-01-05 RX ADMIN — Medication 10 ML: at 08:34

## 2020-01-05 RX ADMIN — FOLIC ACID 1 MG: 1 TABLET ORAL at 08:33

## 2020-01-05 RX ADMIN — POTASSIUM CHLORIDE 40 MEQ: 20 TABLET, EXTENDED RELEASE ORAL at 00:05

## 2020-01-05 RX ADMIN — Medication 100 MG: at 08:33

## 2020-01-05 ASSESSMENT — ENCOUNTER SYMPTOMS
ABDOMINAL PAIN: 0
FACIAL SWELLING: 0
ABDOMINAL DISTENTION: 0
EYE DISCHARGE: 0
CHEST TIGHTNESS: 0
BACK PAIN: 0
SHORTNESS OF BREATH: 0
EYE PAIN: 0

## 2020-01-05 ASSESSMENT — PAIN SCALES - GENERAL
PAINLEVEL_OUTOF10: 0

## 2020-01-05 NOTE — ED PROVIDER NOTES
Result Value    RDW 15.8 (*)     Seg Neutrophils 68 (*)     Lymphocytes 22 (*)     All other components within normal limits   BASIC METABOLIC PANEL W/ REFLEX TO MG FOR LOW K - Abnormal; Notable for the following components:    BUN 4 (*)     Bun/Cre Ratio 7 (*)     Potassium 2.9 (*)     Chloride 96 (*)     CO2 18 (*)     Anion Gap 22 (*)     All other components within normal limits   LACTIC ACID - Abnormal; Notable for the following components:    Lactic Acid 6.3 (*)     All other components within normal limits   URINALYSIS WITH MICROSCOPIC - Abnormal; Notable for the following components:    Turbidity UA SLIGHTLY CLOUDY (*)     Urine Hgb TRACE (*)     Protein, UA 2+ (*)     Mucus, UA 2+ (*)     All other components within normal limits   ETHANOL - Abnormal; Notable for the following components:    Ethanol 16 (*)     Ethanol percent 0.016 (*)     All other components within normal limits   MAGNESIUM - Abnormal; Notable for the following components:    Magnesium 1.5 (*)     All other components within normal limits   LACTIC ACID - Abnormal; Notable for the following components:    Lactic Acid 3.1 (*)     All other components within normal limits   POCT URINE PREGNANCY - Normal   CK   URINE DRUG SCREEN   POC GLUCOSE FINGERSTICK           Disposition   DISPOSITION:    DISPOSITION Admitted 01/04/2020 08:11:50 PM      CLINICAL IMPRESSION:  1. Hypokalemia    2. Hypomagnesemia    3. Syncope and collapse    4.  Observed seizure-like activity ([facility] [address]        PATIENT REFERRED TO:  Kwadwo Grover MD  [place] [id number]  [facility] [address]  [phone]            DISCHARGE MEDICATIONS:  New Prescriptions    No medications on file       Michela Kumar MD  Attending Emergency Physician           Jasvir Crawford MD  01/04/20 2012

## 2020-01-05 NOTE — PROGRESS NOTES
[facility]    1/5/2020    1:57 PM    Name:   Quinn Barrientos  MRN:     [id number]     Acct:      [de-identified]   Room:   [address] [place] [address] Day:  1  Admit Date:  1/4/2020  5:06 PM    PCP:   Aleida Patterson MD  Code Status:  Full Code    Subjective:     C/C: loc, possible seizure  Interval History Status: improved. Feels better  No real recollection of event except had vision be wavy like on a merry go round, then passed out and reportedly foamed at mouth and eyes rolled back in head and stared off; no clonic activity reported    Also has diarrhea, reduced today    Brief History:     Per my SHEILA:  Amelia Rey is a 29 y.o.  female who presents to the hospital with complaint of seizure-like activity. The patient states she was at the grocery store when she started having visual disturbances and felt dizzy. She describes the visual disturbances as \"flashing\", and an inability to focus. She states she thought she was having a panic attack. Her boyfriend reports when they were at the checkout in the store her eyes rolled in the back of her head, and she started \"foaming at the mouth\". He also states she had some sonorous breathing and she was \"just staring\" and not responding to him. He states he lowered her to the ground, and that she lost conciousness but did not hit her head. He reports this lasted for 1 to 3 minutes. The patient bit the left side of her tongue during the episode, but did not lose bowel or bladder. She states intermittent diarrhea for the past week, no abdominal pain, fever or chills. She endorses a mild right-sided unilateral headache. She states this is chronic, and it has occurred intermittently for years. She describes it as a migraine with photophobia, but has never formally been diagnosed. No previous history of seizures.  She does endorse increased alcohol consumption over the holiday season, and states she had 2-3 tobacco. She reports current alcohol use of about 24.0 standard drinks of alcohol per week. She reports that she does not use drugs. Family History:   Family History   Problem Relation Age of Onset    Hypercalcemia Mother     Hypertension Mother     Hypertension Father        Vitals:  /82   Pulse 83   Temp 98.4 °F (36.9 °C) (Oral)   Resp 18   Ht 5' 4\" (1.626 m)   Wt 170 lb (77.1 kg)   LMP 2019   SpO2 99%   BMI 29.18 kg/m²   Temp (24hrs), Av.2 °F (36.8 °C), Min:97.7 °F (36.5 °C), Max:98.6 °F (37 °C)    Recent Labs     20   POCGLU 91       I/O (24Hr):     Intake/Output Summary (Last 24 hours) at 2020 1357  Last data filed at 2020 0903  Gross per 24 hour   Intake 1987 ml   Output 1000 ml   Net 987 ml       Labs:  Hematology:  Recent Labs     20   WBC 5.6 5.4   RBC 4.12 3.53*   HGB 13.5 11.6*   HCT 40.6 35.3*   MCV 98.5 100.0   MCH 32.8 32.9   MCHC 33.3 32.9   RDW 15.8* 16.0*    167   MPV 10.3 10.6   INR  --  1.2     Chemistry:  Recent Labs     20  1022     --  139  --    K 2.9* 3.5* 4.4 3.9   CL 96*  --  107  --    CO2 18*  --  22  --    GLUCOSE 84  --  92  --    BUN 4*  --  2*  --    CREATININE 0.59  --  0.48*  --    MG 1.5* 1.7  --   --    ANIONGAP 22*  --  10  --    LABGLOM >60  --  >60  --    GFRAA >60  --  >60  --    CALCIUM 8.9  --  8.0*  --    CKTOTAL 57  --   --   --      Recent Labs     01/04/20  1951 01/05/20  0425   PROT  --  5.4*   LABALBU  --  3.3*   AST  --  161*   ALT  --  61*   ALKPHOS  --  84   BILITOT  --  0.92   POCGLU 91  --      ABG:No results found for: POCPH, PHART, PH, POCPCO2, FLV6FQI, PCO2, POCPO2, PO2ART, PO2, POCHCO3, MJV3KBA, HCO3, NBEA, PBEA, BEART, BE, THGBART, THB, HGZ4MSA, FZSV2ODT, Q1NTQOXF, O2SAT, FIO2  Lab Results   Component Value Date/Time    SPECIAL NOT REPORTED 2015 03:53 PM     No results found for: CULTURE    Radiology:  Ct Head Wo

## 2020-01-05 NOTE — PROGRESS NOTES
Nutrition Assessment (Low Risk)    Type and Reason for Visit: Initial, Positive Nutrition Screen(weight loss, appetite, n/v)    Nutrition Recommendations:    1. Continue general diet  2. Monitor changes in nutrition status    Nutrition Assessment:   Patient appears to be adequately nourished upon admit and is deemed low nutrition risk as evidenced by no noticable fat or muscle loss, no significant unintentional weight loss, and patient eating sufficiently prior to admit. However, pt. at risk for nutrition compromise related to history of CVS (cyclic vomiting syndrome) in which causes her weight and appetite to fluctuate. Pt. declines need for a nutrition supplement.  Will monitor intakes, weight, and n/v.     Malnutrition Assessment:  · Malnutrition Status: No malnutrition    Nutrition Risk Level   Risk Level: Low    Nutrition Diagnosis:   · Problem: No nutrition diagnosis at this time    Nutrition Intervention:  Food and/or Delivery: Continue current diet  Nutrition Education/Counseling/Coordination of Care:  Continued Inpatient Monitoring, Coordination of Care      [facility], [place], [place]  RD Office Phone: [phone]
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
## 2020-01-05 NOTE — CONSULTS
Spouse name: Not on file    Number of children: Not on file    Years of education: Not on file    Highest education level: Not on file   Occupational History    Not on file   Social Needs    Financial resource strain: Not on file    Food insecurity:     Worry: Not on file     Inability: Not on file    Transportation needs:     Medical: Not on file     Non-medical: Not on file   Tobacco Use    Smoking status: Current Every Day Smoker     Packs/day: 0.50     Types: Cigarettes    Smokeless tobacco: Never Used   Substance and Sexual Activity    Alcohol use:  Yes     Alcohol/week: 24.0 standard drinks     Types: 24 Cans of beer per week     Comment: social    Drug use: No    Sexual activity: Yes     Partners: Male   Lifestyle    Physical activity:     Days per week: Not on file     Minutes per session: Not on file    Stress: Not on file   Relationships    Social connections:     Talks on phone: Not on file     Gets together: Not on file     Attends Scientologist service: Not on file     Active member of club or organization: Not on file     Attends meetings of clubs or organizations: Not on file     Relationship status: Not on file    Intimate partner violence:     Fear of current or ex partner: Not on file     Emotionally abused: Not on file     Physically abused: Not on file     Forced sexual activity: Not on file   Other Topics Concern    Not on file   Social History Narrative    Not on file       MEDICATIONS:     Current Facility-Administered Medications   Medication Dose Route Frequency Provider Last Rate Last Dose    0.9 % sodium chloride infusion   Intravenous Continuous ANIKA Jerry  mL/hr at 01/04/20 2252      sodium chloride flush 0.9 % injection 10 mL  10 mL Intravenous 2 times per day ANIKA Jimenez CNP   10 mL at 01/05/20 0834    sodium chloride flush 0.9 % injection 10 mL  10 mL Intravenous PRN ANIKA Jimenez CNP        magnesium hydroxide (MILK OF MAGNESIA) 400 MG/5ML suspension 30 mL  30 mL Oral Daily PRN Austin Sailors, APRN - CNP        nicotine (NICODERM CQ) 21 MG/24HR 1 patch  1 patch Transdermal Daily PRN Austin Sailors, APRN - CNP        enoxaparin (LOVENOX) injection 40 mg  40 mg Subcutaneous Daily Austin Sailors, APRN - CNP   40 mg at 01/05/20 8523    acetaminophen (TYLENOL) tablet 650 mg  650 mg Oral Q4H PRN Austin Sailors, APRN - CNP        vitamin B-1 (THIAMINE) tablet 100 mg  100 mg Oral Daily Austin Sailors, APRN - CNP   100 mg at 01/22/36 8991    folic acid (FOLVITE) tablet 1 mg  1 mg Oral Daily Austin Sailors, APRN - CNP   1 mg at 01/05/20 4440    multivitamin 1 tablet  1 tablet Oral Daily Austin Sailors, APRN - CNP   1 tablet at 01/05/20 3600    LORazepam (ATIVAN) injection 2 mg  2 mg Intravenous Q4H PRN Austin Sailors, APRN - CNP        LORazepam (ATIVAN) tablet 1 mg  1 mg Oral Q1H PRN Austin Sailors, APRN - CNP        Or    LORazepam (ATIVAN) injection 1 mg  1 mg Intravenous Q1H PRN Austin Sailors, APRN - CNP        Or    LORazepam (ATIVAN) tablet 2 mg  2 mg Oral Q1H PRN Austin Sailors, APRN - CNP        Or    LORazepam (ATIVAN) injection 2 mg  2 mg Intravenous Q1H PRN Austin Sailors, APRN - CNP        Or    LORazepam (ATIVAN) tablet 3 mg  3 mg Oral Q1H PRN Austin Sailors, APRN - CNP        Or    LORazepam (ATIVAN) injection 3 mg  3 mg Intravenous Q1H PRN Austin Sailors, APRN - CNP        Or    LORazepam (ATIVAN) tablet 4 mg  4 mg Oral Q1H PRN Austin Sailors, APRN - CNP        Or    LORazepam (ATIVAN) injection 4 mg  4 mg Intravenous Q1H PRN Austin Sailors, APRN - CNP        potassium chloride (KLOR-CON M) extended release tablet 40 mEq  40 mEq Oral PRN Austin Sailors, APRN - CNP   40 mEq at 01/05/20 0005    Or    potassium bicarb-citric acid (EFFER-K) effervescent tablet 40 mEq 40 mEq Oral PRN Geralene Brands, APRN - CNP        Or    potassium chloride 10 mEq/100 mL IVPB (Peripheral Line)  10 mEq Intravenous PRN Geralene Brands, APRN - CNP        magnesium sulfate 1 g in dextrose 5% 100 mL IVPB  1 g Intravenous PRN Geralene Brands, APRN - CNP        ondansetron (ZOFRAN-ODT) disintegrating tablet 4 mg  4 mg Oral Q6H PRN Geralene Brands, APRN - CNP        Or    ondansetron [facility] injection 4 mg  4 mg Intravenous Q6H PRN Geralene Brands, APRN - CNP            ALLERGIES:     Allergies   Allergen Reactions    Seasonal        REVIEW OF SYSTEMS:        CONSTITUTIONAL Weight change: absent, Appetite change: absent, Fatigue: present    HEENT Ears: normal, Visual disturbance: present    RESPIRATORY Shortness of breath: absent, Cough: absent    CARDIOVASCULAR Chest pain: absent, Leg swelling :absent    GI Constipation: absent, Diarrhea: absent, Swallowing change: absent     Urinary frequency: absent, Urinary urgency: absent, Urinary incontinence: absent    MUSCULOSKELETAL Neck pain: absent, Back pain: absent, Stiffness: absent, Muscle pain: absent, Joint pain: absent Restless legs: absent    DERMATOLOGIC Hair loss: absent, Skin changes: absent    NEUROLOGIC Memory loss: absent, Confusion: absent, Seizures: present Trouble walking or imbalance: absent, Dizziness: absent, Weakness: absent, Numbness: absent Tremor: absent, Spasm: absent, Speech difficulty: absent, Headache: present, Light sensitivity: absent    PSYCHIATRIC Anxiety: absent, Hallucination: absent, Mood disorder: absent    HEMATOLOGIC Abnormal bleeding: absent, Anemia: absent, Clotting disorder: absent, Lymph gland changes: absent     VITALS  /82   Pulse 83   Temp 98.4 °F (36.9 °C) (Oral)   Resp 18   Ht 5' 4\" (1.626 m)   Wt 159 lb 8 oz (72.3 kg)   LMP 12/29/2019   SpO2 99%   BMI 27.38 kg/m²      PHYSICAL EXAMINATION:     General appearance: cooperative  Skin: no rash or skin Sincerely,    Lalitha Reyes MD

## 2020-01-05 NOTE — H&P
[address]     HISTORY AND PHYSICAL EXAMINATION            Date:   1/4/2020  Patient name:  Alix Albrecht  Date of admission:  1/4/2020  5:06 PM  MRN:   [id number]  Account:  [de-identified]  YOB: 1985  PCP:    Anabel Barron MD  Room:   [facility]/1011-02  Code Status:    Full Code    Chief Complaint:     Seizure-like activity. History Obtained From:     Patient, patient's boyfriend and electronic medical record. History of Present Illness:     Alix Albrecht is a 29 y.o.  female who presents to the hospital with complaint of seizure-like activity. The patient states she was at the grocery store when she started having visual disturbances and felt dizzy. She describes the visual disturbances as \"flashing\", and an inability to focus. She states she thought she was having a panic attack. Her boyfriend reports when they were at the checkout in the store her eyes rolled in the back of her head, and she started \"foaming at the mouth\". He also states she had some sonorous breathing and she was \"just staring\" and not responding to him. He states he lowered her to the ground, and that she lost conciousness but did not hit her head. He reports this lasted for 1 to 3 minutes. The patient bit the left side of her tongue during the episode, but did not lose bowel or bladder. She states intermittent diarrhea for the past week, no abdominal pain, fever or chills. She endorses a mild right-sided unilateral headache. She states this is chronic, and it has occurred intermittently for years. She describes it as a migraine with photophobia, but has never formally been diagnosed. No previous history of seizures. She does endorse increased alcohol consumption over the holiday season, and states she had 2-3 shots of gin earlier in the day. Denies recreational drug use or daily alcohol consumption.  She denies additional symptomology or modifying factors. Toxicology screen negative, hCG negative, ethanol level 0.016%. Calcium 2.9 CO2 18 anion gap 22 magnesium 1.5 lactic acid 6.3 & 3.1. CT of head without contrast indicates no acute intracranial abnormality. Chronic encephalomalacia left anterior frontal lobe. CT cervical spine indicates no acute fracture or traumatic malalignment. Past Medical History:     Past Medical History:   Diagnosis Date    Anxiety     CVS disease     HPV (human papilloma virus) anogenital infection         Past Surgical History:     Past Surgical History:   Procedure Laterality Date    GYNECOLOGIC CRYOSURGERY      LEEP      x2        Medications Prior to Admission:     Prior to Admission medications    Medication Sig Start Date End Date Taking? Authorizing Provider   metoclopramide (REGLAN) 10 MG tablet Take 10 mg by mouth 4 times daily as needed (nausea)   Yes Historical Provider, MD   loratadine (CLARITIN) 10 MG tablet Take 10 mg by mouth daily   Yes Historical Provider, MD        Allergies:     Seasonal    Social History:     Tobacco:    reports that she has been smoking cigarettes. She has been smoking about 0.50 packs per day. She has never used smokeless tobacco.  Alcohol:      reports current alcohol use of about 24.0 standard drinks of alcohol per week. Drug Use:  reports no history of drug use. Family History:     Family History   Problem Relation Age of Onset    Hypercalcemia Mother     Hypertension Mother     Hypertension Father        Review of Systems:     Positive and Negative as described in HPI. Review of Systems   Constitutional: Negative for chills, diaphoresis and fever. HENT: Negative for congestion. Eyes: Positive for visual disturbance. Respiratory: Negative for cough, chest tightness, shortness of breath and wheezing. Cardiovascular: Negative for chest pain, palpitations and leg swelling. Gastrointestinal: Positive for diarrhea.  Negative for abdominal pain, blood in stool, constipation, nausea and vomiting. Endocrine: Negative for cold intolerance and heat intolerance. Genitourinary: Negative for difficulty urinating, dysuria, frequency and urgency. Musculoskeletal: Negative for arthralgias and myalgias. Skin: Negative for color change and rash. Neurological: Positive for dizziness, syncope and headaches. Negative for weakness and numbness. Hematological: Does not bruise/bleed easily. Psychiatric/Behavioral: The patient is nervous/anxious. All other systems reviewed and are negative. Physical Exam:   /67   Pulse 108   Temp 98.4 °F (36.9 °C) (Oral)   Resp 18   Ht 5' 4\" (1.626 m)   Wt 170 lb (77.1 kg)   LMP 2019   SpO2 97%   BMI 29.18 kg/m²   Temp (24hrs), Av.1 °F (36.7 °C), Min:97.7 °F (36.5 °C), Max:98.4 °F (36.9 °C)    Recent Labs     20  195   POCGLU 91       Intake/Output Summary (Last 24 hours) at 2020 2247  Last data filed at 2020 2123  Gross per 24 hour   Intake 1100 ml   Output --   Net 1100 ml       Physical Exam  Vitals signs and nursing note reviewed. Constitutional:       General: She is not in acute distress. Appearance: She is well-developed. She is not diaphoretic. HENT:      Head: Normocephalic and atraumatic. Right Ear: Hearing normal.      Left Ear: Hearing normal.      Nose: Nose normal. No rhinorrhea. Mouth/Throat:      Comments: Left buccal aspect of tongue reddened with small bruise. Eyes:      General: Lids are normal.      Extraocular Movements:      Right eye: Normal extraocular motion. Left eye: Normal extraocular motion. Conjunctiva/sclera: Conjunctivae normal.      Right eye: Right conjunctiva is not injected. Left eye: Left conjunctiva is not injected. Pupils: Pupils are equal, round, and reactive to light. Pupils are equal.      Right eye: Pupil is reactive. Left eye: Pupil is reactive. Neck:      Musculoskeletal: Neck supple.       Thyroid: No thyromegaly. Vascular: No carotid bruit. Trachea: Trachea and phonation normal. No tracheal deviation. Cardiovascular:      Rate and Rhythm: Regular rhythm. Tachycardia present. Pulses: Normal pulses. Heart sounds: Normal heart sounds. No murmur. Pulmonary:      Effort: Pulmonary effort is normal. No respiratory distress. Breath sounds: Normal breath sounds. No stridor. No decreased breath sounds. Abdominal:      General: Bowel sounds are normal. There is no distension. Palpations: Abdomen is soft. There is no mass. Tenderness: There is no tenderness. There is no guarding. Musculoskeletal:         General: No tenderness. Skin:     General: Skin is warm and dry. Findings: No erythema, lesion or rash. Neurological:      General: No focal deficit present. Mental Status: She is alert and oriented to person, place, and time. She is not disoriented. GCS: GCS eye subscore is 4. GCS verbal subscore is 5. GCS motor subscore is 6. Cranial Nerves: No cranial nerve deficit. Psychiatric:         Speech: Speech normal.         Behavior: Behavior normal. Behavior is cooperative.          Investigations:      Laboratory Testing:  Recent Results (from the past 24 hour(s))   CBC Auto Differential    Collection Time: 01/04/20  5:13 PM   Result Value Ref Range    WBC 5.6 3.5 - 11.3 k/uL    RBC 4.12 3.95 - 5.11 m/uL    Hemoglobin 13.5 11.9 - 15.1 g/dL    Hematocrit 40.6 36.3 - 47.1 %    MCV 98.5 82.6 - 102.9 fL    MCH 32.8 25.2 - 33.5 pg    MCHC 33.3 28.4 - 34.8 g/dL    RDW 15.8 (H) 11.8 - 14.4 %    Platelets 307 643 - 589 k/uL    MPV 10.3 8.1 - 13.5 fL    NRBC Automated 0.0 0.0 per 100 WBC    Differential Type NOT REPORTED     Seg Neutrophils 68 (H) 36 - 65 %    Lymphocytes 22 (L) 24 - 43 %    Monocytes 7 3 - 12 %    Eosinophils % 2 1 - 4 %    Basophils 1 0 - 2 %    Immature Granulocytes 0 0 %    Segs Absolute 3.76 1.50 - 8.10 k/uL    Absolute Lymph # 1.21 1.10 - 3.70 k/uL    Absolute Mono # 0.39 0.10 - 1.20 k/uL    Absolute Eos # 0.12 0.00 - 0.44 k/uL    Basophils Absolute 0.06 0.00 - 0.20 k/uL    Absolute Immature Granulocyte 0.01 0.00 - 0.30 k/uL    WBC Morphology NOT REPORTED     RBC Morphology ANISOCYTOSIS PRESENT     Platelet Estimate NOT REPORTED    Basic Metabolic Panel w/ Reflex to MG    Collection Time: 01/04/20  5:13 PM   Result Value Ref Range    Glucose 84 70 - 99 mg/dL    BUN 4 (L) 6 - 20 mg/dL    CREATININE 0.59 0.50 - 0.90 mg/dL    Bun/Cre Ratio 7 (L) 9 - 20    Calcium 8.9 8.6 - 10.4 mg/dL    Sodium 136 135 - 144 mmol/L    Potassium 2.9 (LL) 3.7 - 5.3 mmol/L    Chloride 96 (L) 98 - 107 mmol/L    CO2 18 (L) 20 - 31 mmol/L    Anion Gap 22 (H) 9 - 17 mmol/L    GFR Non-African American >60 >60 mL/min    GFR African American >60 >60 mL/min    GFR Comment          GFR Staging NOT REPORTED    Lactic Acid    Collection Time: 01/04/20  5:13 PM   Result Value Ref Range    Lactic Acid 6.3 (H) 0.5 - 2.2 mmol/L   CK    Collection Time: 01/04/20  5:13 PM   Result Value Ref Range    Total CK 57 26 - 192 U/L   Ethanol    Collection Time: 01/04/20  5:13 PM   Result Value Ref Range    Ethanol 16 (H) <10 mg/dL    Ethanol percent 0.016 (H) <0.010 %   Magnesium    Collection Time: 01/04/20  5:13 PM   Result Value Ref Range    Magnesium 1.5 (L) 1.6 - 2.6 mg/dL   Urinalysis with Microscopic    Collection Time: 01/04/20  6:15 PM   Result Value Ref Range    Color, UA YELLOW YELLOW    Turbidity UA SLIGHTLY CLOUDY (A) CLEAR    Glucose, Ur NEGATIVE NEGATIVE    Bilirubin Urine NEGATIVE NEGATIVE    Ketones, Urine NEGATIVE NEGATIVE    Specific Gravity, UA 1.025 1.005 - 1.030    Urine Hgb TRACE (A) NEGATIVE    pH, UA 5.5 5.0 - 8.0    Protein, UA 2+ (A) NEGATIVE    Urobilinogen, Urine Normal Normal    Nitrite, Urine NEGATIVE NEGATIVE    Leukocyte Esterase, Urine NEGATIVE NEGATIVE    Urinalysis Comments NOT REPORTED     -          WBC, UA 5 TO 10 0 - 5 /HPF    RBC, UA 2 TO 5 0 - 2 /HPF    Casts UA

## 2020-01-06 ENCOUNTER — APPOINTMENT (OUTPATIENT)
Dept: MRI IMAGING | Age: 35
DRG: 058 | End: 2020-01-06
Payer: MEDICARE

## 2020-01-06 VITALS
BODY MASS INDEX: 27.23 KG/M2 | DIASTOLIC BLOOD PRESSURE: 82 MMHG | SYSTOLIC BLOOD PRESSURE: 123 MMHG | TEMPERATURE: 98.1 F | OXYGEN SATURATION: 97 % | RESPIRATION RATE: 20 BRPM | WEIGHT: 159.5 LBS | HEART RATE: 67 BPM | HEIGHT: 64 IN

## 2020-01-06 LAB
ANION GAP SERPL CALCULATED.3IONS-SCNC: 7 MMOL/L (ref 9–17)
BUN BLDV-MCNC: <2 MG/DL (ref 6–20)
BUN/CREAT BLD: ABNORMAL (ref 9–20)
C DIFF AG + TOXIN: ABNORMAL
C DIFFICILE TOXINS, PCR: NORMAL
CALCIUM SERPL-MCNC: 8.7 MG/DL (ref 8.6–10.4)
CHLORIDE BLD-SCNC: 105 MMOL/L (ref 98–107)
CO2: 26 MMOL/L (ref 20–31)
CREAT SERPL-MCNC: 0.54 MG/DL (ref 0.5–0.9)
GFR AFRICAN AMERICAN: >60 ML/MIN
GFR NON-AFRICAN AMERICAN: >60 ML/MIN
GFR SERPL CREATININE-BSD FRML MDRD: ABNORMAL ML/MIN/{1.73_M2}
GFR SERPL CREATININE-BSD FRML MDRD: ABNORMAL ML/MIN/{1.73_M2}
GLUCOSE BLD-MCNC: 99 MG/DL (ref 70–99)
POTASSIUM SERPL-SCNC: 4 MMOL/L (ref 3.7–5.3)
SODIUM BLD-SCNC: 138 MMOL/L (ref 135–144)
SPECIMEN DESCRIPTION: ABNORMAL
SPECIMEN DESCRIPTION: NORMAL

## 2020-01-06 PROCEDURE — 6360000004 HC RX CONTRAST MEDICATION: Performed by: PSYCHIATRY & NEUROLOGY

## 2020-01-06 PROCEDURE — 2580000003 HC RX 258: Performed by: NURSE PRACTITIONER

## 2020-01-06 PROCEDURE — 6360000002 HC RX W HCPCS: Performed by: NURSE PRACTITIONER

## 2020-01-06 PROCEDURE — 99232 SBSQ HOSP IP/OBS MODERATE 35: CPT | Performed by: PSYCHIATRY & NEUROLOGY

## 2020-01-06 PROCEDURE — 95819 EEG AWAKE AND ASLEEP: CPT | Performed by: PSYCHIATRY & NEUROLOGY

## 2020-01-06 PROCEDURE — 6370000000 HC RX 637 (ALT 250 FOR IP): Performed by: NURSE PRACTITIONER

## 2020-01-06 PROCEDURE — 2580000003 HC RX 258: Performed by: PSYCHIATRY & NEUROLOGY

## 2020-01-06 PROCEDURE — A9579 GAD-BASE MR CONTRAST NOS,1ML: HCPCS | Performed by: PSYCHIATRY & NEUROLOGY

## 2020-01-06 PROCEDURE — 95819 EEG AWAKE AND ASLEEP: CPT

## 2020-01-06 PROCEDURE — 99232 SBSQ HOSP IP/OBS MODERATE 35: CPT | Performed by: FAMILY MEDICINE

## 2020-01-06 PROCEDURE — 36415 COLL VENOUS BLD VENIPUNCTURE: CPT

## 2020-01-06 PROCEDURE — 80048 BASIC METABOLIC PNL TOTAL CA: CPT

## 2020-01-06 PROCEDURE — 95816 EEG AWAKE AND DROWSY: CPT

## 2020-01-06 PROCEDURE — 70553 MRI BRAIN STEM W/O & W/DYE: CPT

## 2020-01-06 RX ORDER — SODIUM CHLORIDE 0.9 % (FLUSH) 0.9 %
10 SYRINGE (ML) INJECTION
Status: COMPLETED | OUTPATIENT
Start: 2020-01-06 | End: 2020-01-06

## 2020-01-06 RX ORDER — LEVETIRACETAM 500 MG/1
500 TABLET ORAL 2 TIMES DAILY
Status: DISCONTINUED | OUTPATIENT
Start: 2020-01-06 | End: 2020-01-06 | Stop reason: HOSPADM

## 2020-01-06 RX ORDER — LEVETIRACETAM 500 MG/1
500 TABLET ORAL 2 TIMES DAILY
Qty: 60 TABLET | Refills: 0 | Status: ON HOLD | OUTPATIENT
Start: 2020-01-06 | End: 2022-05-13

## 2020-01-06 RX ADMIN — MULTIVITAMIN TABLET 1 TABLET: TABLET at 10:11

## 2020-01-06 RX ADMIN — Medication 10 ML: at 07:46

## 2020-01-06 RX ADMIN — SODIUM CHLORIDE: 9 INJECTION, SOLUTION INTRAVENOUS at 04:00

## 2020-01-06 RX ADMIN — FOLIC ACID 1 MG: 1 TABLET ORAL at 10:11

## 2020-01-06 RX ADMIN — GADOTERIDOL 15 ML: 279.3 INJECTION, SOLUTION INTRAVENOUS at 07:46

## 2020-01-06 RX ADMIN — Medication 100 MG: at 10:11

## 2020-01-06 RX ADMIN — ENOXAPARIN SODIUM 40 MG: 40 INJECTION SUBCUTANEOUS at 10:11

## 2020-01-06 ASSESSMENT — ENCOUNTER SYMPTOMS
CHEST TIGHTNESS: 0
NAUSEA: 0
DIARRHEA: 0
RHINORRHEA: 0
BLOOD IN STOOL: 0
VOMITING: 0
COUGH: 0
ABDOMINAL PAIN: 0
SHORTNESS OF BREATH: 0
CONSTIPATION: 0
WHEEZING: 0

## 2020-01-06 ASSESSMENT — PAIN SCALES - GENERAL
PAINLEVEL_OUTOF10: 0
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
## 2020-01-06 NOTE — PLAN OF CARE
Problem: Falls - Risk of:  Goal: Will remain free from falls  Description  Will remain free from falls  1/6/2020 0148 by Richie Renee RN  Outcome: Ongoing     Problem: Safety:  Goal: Ability to remain free from injury will improve  Description  Ability to remain free from injury will improve  Outcome: Ongoing
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
## 2020-01-06 NOTE — PROCEDURES
EEG REPORT    Patient Name: Kassy Han  Patient MRN: [id number]    Referring Physician: Randall Mckeon MD  Dictating Physician: Laurie Miles DO  Date: 1/6/19      CLINICAL HISTORY: This EEG was performed on a 29 y.o. female with new onset seizure. It is being performed to evaluate for seizure activity. CURRENT ANTI-EPILEPTIC MEDICATIONS: none     DESCRIPTION: Wakefulness, drowsiness, and Stage II sleep were obtained. During wakefulness there was a posterior background of regular, reactive, symmetrical, moderate voltage 10 Hz activity with superimposed runs of 14-16 Hz excess beta activity. During drowsiness there were symmetrical vertex sharp waves. Occasional left frontal 2 - 3 Hz slow waves were seen. Photic stimulation evoked a symmetrical posterior driving response at some flash frequencies. Hyperventilation was performed and elicited no abnormalities. EEG DIAGNOSIS: This EEG was abnormal due to the presence of:  1) Occasional left frontal 2 - 3 Hz slow waves   2) Excessive beta activity    CLINICAL INTERPRETATION:  The focal slowing is a sign of focal cortical neuronal dysfunction in the involved region. The excessive beta activity. No electrographic seizures were recorded. The excessive beta activity can be seen in the setting of medication effect such as benzodiazepines, also in the setting of anxiety.       Laurie Miles, [address] [place] [address]  Neurology

## 2022-05-13 ENCOUNTER — APPOINTMENT (OUTPATIENT)
Dept: ULTRASOUND IMAGING | Age: 37
End: 2022-05-13
Payer: MEDICARE

## 2022-05-13 ENCOUNTER — HOSPITAL ENCOUNTER (OUTPATIENT)
Age: 37
Setting detail: OBSERVATION
Discharge: HOME | End: 2022-05-15
Attending: EMERGENCY MEDICINE | Admitting: INTERNAL MEDICINE
Payer: MEDICARE

## 2022-05-13 ENCOUNTER — APPOINTMENT (OUTPATIENT)
Dept: INTERVENTIONAL RADIOLOGY/VASCULAR | Age: 37
End: 2022-05-13
Payer: MEDICARE

## 2022-05-13 ENCOUNTER — APPOINTMENT (OUTPATIENT)
Dept: CT IMAGING | Facility: CLINIC | Age: 37
End: 2022-05-13
Payer: MEDICARE

## 2022-05-13 DIAGNOSIS — R10.13 EPIGASTRIC PAIN: Primary | ICD-10-CM

## 2022-05-13 DIAGNOSIS — K70.31 ALCOHOLIC CIRRHOSIS OF LIVER WITH ASCITES (HCC): ICD-10-CM

## 2022-05-13 PROBLEM — K65.2 SBP (SPONTANEOUS BACTERIAL PERITONITIS) (HCC): Status: ACTIVE | Noted: 2022-05-13

## 2022-05-13 PROBLEM — K76.6 PORTAL HYPERTENSION (HCC): Status: ACTIVE | Noted: 2022-05-13

## 2022-05-13 PROBLEM — R10.9 ABDOMINAL PAIN: Status: ACTIVE | Noted: 2022-05-13

## 2022-05-13 PROBLEM — D64.9 NORMOCYTIC ANEMIA: Status: ACTIVE | Noted: 2022-05-13

## 2022-05-13 PROBLEM — D69.6 THROMBOCYTOPENIA (HCC): Status: ACTIVE | Noted: 2022-05-13

## 2022-05-13 PROBLEM — Z87.19 HISTORY OF ESOPHAGEAL VARICES: Status: ACTIVE | Noted: 2022-05-13

## 2022-05-13 LAB
-: ABNORMAL
ABSOLUTE EOS #: 0 K/UL (ref 0–0.4)
ABSOLUTE LYMPH #: 1.1 K/UL (ref 1–4.8)
ABSOLUTE MONO #: 0.8 K/UL (ref 0.1–1.2)
ALBUMIN FLUID: 0.6 G/DL
ALBUMIN SERPL-MCNC: 3.3 G/DL (ref 3.5–5.2)
ALBUMIN/GLOBULIN RATIO: 0.8 (ref 1–2.5)
ALP BLD-CCNC: 121 U/L (ref 35–104)
ALT SERPL-CCNC: 24 U/L (ref 5–33)
AMORPHOUS: ABNORMAL
AMYLASE FLUID: 26 U/L
ANION GAP SERPL CALCULATED.3IONS-SCNC: 13 MMOL/L (ref 9–17)
AST SERPL-CCNC: 87 U/L
BASOPHILS # BLD: 1 % (ref 0–2)
BASOPHILS ABSOLUTE: 0.1 K/UL (ref 0–0.2)
BILIRUB SERPL-MCNC: 3.2 MG/DL (ref 0.3–1.2)
BILIRUBIN URINE: ABNORMAL
BUN BLDV-MCNC: 6 MG/DL (ref 6–20)
CALCIUM SERPL-MCNC: 9 MG/DL (ref 8.6–10.4)
CHLORIDE BLD-SCNC: 103 MMOL/L (ref 98–107)
CO2: 18 MMOL/L (ref 20–31)
COLOR: ABNORMAL
CREAT SERPL-MCNC: 0.5 MG/DL (ref 0.5–0.9)
EOSINOPHILS RELATIVE PERCENT: 0 % (ref 1–4)
EPITHELIAL CELLS UA: ABNORMAL /HPF (ref 0–5)
GFR AFRICAN AMERICAN: >60 ML/MIN
GFR NON-AFRICAN AMERICAN: >60 ML/MIN
GFR SERPL CREATININE-BSD FRML MDRD: ABNORMAL ML/MIN/{1.73_M2}
GLUCOSE BLD-MCNC: 85 MG/DL (ref 70–99)
GLUCOSE URINE: NEGATIVE
GLUCOSE, FLUID: 91 MG/DL
HCG QUALITATIVE: NEGATIVE
HCT VFR BLD CALC: 29.3 % (ref 36–46)
HEMOGLOBIN: 9.8 G/DL (ref 12–16)
INR BLD: 1.8
KETONES, URINE: NEGATIVE
LACTATE DEHYDROGENASE, FLUID: 40 U/L
LACTIC ACID, SEPSIS: 1.3 MMOL/L (ref 0.5–1.9)
LACTIC ACID, SEPSIS: 1.4 MMOL/L (ref 0.5–1.9)
LEUKOCYTE ESTERASE, URINE: NEGATIVE
LIPASE: 53 U/L (ref 13–60)
LYMPHOCYTES # BLD: 17 % (ref 24–44)
MCH RBC QN AUTO: 28.7 PG (ref 26–34)
MCHC RBC AUTO-ENTMCNC: 33.5 G/DL (ref 31–37)
MCV RBC AUTO: 85.6 FL (ref 80–100)
MONOCYTES # BLD: 12 % (ref 2–11)
MUCUS: ABNORMAL
NITRITE, URINE: NEGATIVE
PARTIAL THROMBOPLASTIN TIME: 59.1 SEC (ref 23.9–33.8)
PDW BLD-RTO: 17.7 % (ref 12.5–15.4)
PH UA: 5.5 (ref 5–8)
PLATELET # BLD: 90 K/UL (ref 140–450)
PMV BLD AUTO: 6.9 FL (ref 6–12)
POTASSIUM SERPL-SCNC: 3.7 MMOL/L (ref 3.7–5.3)
PROTEIN UA: NEGATIVE
PROTHROMBIN TIME: 21.1 SEC (ref 11.5–14.2)
RBC # BLD: 3.42 M/UL (ref 4–5.2)
RBC UA: ABNORMAL /HPF (ref 0–2)
SEG NEUTROPHILS: 70 % (ref 36–66)
SEGMENTED NEUTROPHILS ABSOLUTE COUNT: 4.5 K/UL (ref 1.8–7.7)
SODIUM BLD-SCNC: 134 MMOL/L (ref 135–144)
SPECIFIC GRAVITY UA: 1.08 (ref 1–1.03)
SPECIMEN TYPE: NORMAL
TOTAL PROTEIN, BODY FLUID: 1 G/DL
TOTAL PROTEIN: 7.3 G/DL (ref 6.4–8.3)
TURBIDITY: ABNORMAL
URINE HGB: NEGATIVE
UROBILINOGEN, URINE: NORMAL
WBC # BLD: 6.4 K/UL (ref 3.5–11)
WBC UA: ABNORMAL /HPF (ref 0–5)

## 2022-05-13 PROCEDURE — 80053 COMPREHEN METABOLIC PANEL: CPT

## 2022-05-13 PROCEDURE — G0378 HOSPITAL OBSERVATION PER HR: HCPCS

## 2022-05-13 PROCEDURE — 2500000003 HC RX 250 WO HCPCS: Performed by: NURSE PRACTITIONER

## 2022-05-13 PROCEDURE — 82042 OTHER SOURCE ALBUMIN QUAN EA: CPT

## 2022-05-13 PROCEDURE — 6370000000 HC RX 637 (ALT 250 FOR IP): Performed by: INTERNAL MEDICINE

## 2022-05-13 PROCEDURE — 96374 THER/PROPH/DIAG INJ IV PUSH: CPT

## 2022-05-13 PROCEDURE — 83615 LACTATE (LD) (LDH) ENZYME: CPT

## 2022-05-13 PROCEDURE — 96361 HYDRATE IV INFUSION ADD-ON: CPT

## 2022-05-13 PROCEDURE — 84703 CHORIONIC GONADOTROPIN ASSAY: CPT

## 2022-05-13 PROCEDURE — 99244 OFF/OP CNSLTJ NEW/EST MOD 40: CPT | Performed by: INTERNAL MEDICINE

## 2022-05-13 PROCEDURE — 87040 BLOOD CULTURE FOR BACTERIA: CPT

## 2022-05-13 PROCEDURE — 85610 PROTHROMBIN TIME: CPT

## 2022-05-13 PROCEDURE — 6370000000 HC RX 637 (ALT 250 FOR IP): Performed by: NURSE PRACTITIONER

## 2022-05-13 PROCEDURE — 83605 ASSAY OF LACTIC ACID: CPT

## 2022-05-13 PROCEDURE — 99220 PR INITIAL OBSERVATION CARE/DAY 70 MINUTES: CPT | Performed by: INTERNAL MEDICINE

## 2022-05-13 PROCEDURE — 74177 CT ABD & PELVIS W/CONTRAST: CPT

## 2022-05-13 PROCEDURE — 49083 ABD PARACENTESIS W/IMAGING: CPT

## 2022-05-13 PROCEDURE — 96376 TX/PRO/DX INJ SAME DRUG ADON: CPT

## 2022-05-13 PROCEDURE — 99285 EMERGENCY DEPT VISIT HI MDM: CPT

## 2022-05-13 PROCEDURE — 88305 TISSUE EXAM BY PATHOLOGIST: CPT

## 2022-05-13 PROCEDURE — 6360000004 HC RX CONTRAST MEDICATION: Performed by: EMERGENCY MEDICINE

## 2022-05-13 PROCEDURE — 87205 SMEAR GRAM STAIN: CPT

## 2022-05-13 PROCEDURE — 96375 TX/PRO/DX INJ NEW DRUG ADDON: CPT

## 2022-05-13 PROCEDURE — 83690 ASSAY OF LIPASE: CPT

## 2022-05-13 PROCEDURE — 36415 COLL VENOUS BLD VENIPUNCTURE: CPT

## 2022-05-13 PROCEDURE — 2580000003 HC RX 258: Performed by: INTERNAL MEDICINE

## 2022-05-13 PROCEDURE — 6360000002 HC RX W HCPCS: Performed by: INTERNAL MEDICINE

## 2022-05-13 PROCEDURE — 96372 THER/PROPH/DIAG INJ SC/IM: CPT

## 2022-05-13 PROCEDURE — 87070 CULTURE OTHR SPECIMN AEROBIC: CPT

## 2022-05-13 PROCEDURE — 6360000002 HC RX W HCPCS: Performed by: EMERGENCY MEDICINE

## 2022-05-13 PROCEDURE — 6360000002 HC RX W HCPCS: Performed by: NURSE PRACTITIONER

## 2022-05-13 PROCEDURE — 85730 THROMBOPLASTIN TIME PARTIAL: CPT

## 2022-05-13 PROCEDURE — 81001 URINALYSIS AUTO W/SCOPE: CPT

## 2022-05-13 PROCEDURE — 84157 ASSAY OF PROTEIN OTHER: CPT

## 2022-05-13 PROCEDURE — 88112 CYTOPATH CELL ENHANCE TECH: CPT

## 2022-05-13 PROCEDURE — 89051 BODY FLUID CELL COUNT: CPT

## 2022-05-13 PROCEDURE — 87075 CULTR BACTERIA EXCEPT BLOOD: CPT

## 2022-05-13 PROCEDURE — 82150 ASSAY OF AMYLASE: CPT

## 2022-05-13 PROCEDURE — 85025 COMPLETE CBC W/AUTO DIFF WBC: CPT

## 2022-05-13 PROCEDURE — 2580000003 HC RX 258: Performed by: EMERGENCY MEDICINE

## 2022-05-13 PROCEDURE — C1729 CATH, DRAINAGE: HCPCS

## 2022-05-13 PROCEDURE — 76705 ECHO EXAM OF ABDOMEN: CPT

## 2022-05-13 PROCEDURE — 82945 GLUCOSE OTHER FLUID: CPT

## 2022-05-13 PROCEDURE — 2580000003 HC RX 258: Performed by: NURSE PRACTITIONER

## 2022-05-13 PROCEDURE — 96365 THER/PROPH/DIAG IV INF INIT: CPT

## 2022-05-13 RX ORDER — POLYETHYLENE GLYCOL 3350 17 G/17G
17 POWDER, FOR SOLUTION ORAL DAILY PRN
Status: DISCONTINUED | OUTPATIENT
Start: 2022-05-13 | End: 2022-05-15 | Stop reason: HOSPADM

## 2022-05-13 RX ORDER — MAGNESIUM SULFATE 1 G/100ML
1000 INJECTION INTRAVENOUS PRN
Status: DISCONTINUED | OUTPATIENT
Start: 2022-05-13 | End: 2022-05-15 | Stop reason: HOSPADM

## 2022-05-13 RX ORDER — SODIUM CHLORIDE 9 MG/ML
INJECTION, SOLUTION INTRAVENOUS PRN
Status: DISCONTINUED | OUTPATIENT
Start: 2022-05-13 | End: 2022-05-15 | Stop reason: HOSPADM

## 2022-05-13 RX ORDER — ENOXAPARIN SODIUM 100 MG/ML
40 INJECTION SUBCUTANEOUS DAILY
Status: DISCONTINUED | OUTPATIENT
Start: 2022-05-13 | End: 2022-05-15 | Stop reason: HOSPADM

## 2022-05-13 RX ORDER — ACETAMINOPHEN 325 MG/1
650 TABLET ORAL EVERY 6 HOURS PRN
Status: DISCONTINUED | OUTPATIENT
Start: 2022-05-13 | End: 2022-05-15 | Stop reason: HOSPADM

## 2022-05-13 RX ORDER — POTASSIUM CHLORIDE 20 MEQ/1
40 TABLET, EXTENDED RELEASE ORAL PRN
Status: DISCONTINUED | OUTPATIENT
Start: 2022-05-13 | End: 2022-05-13

## 2022-05-13 RX ORDER — LACTULOSE 10 G/15ML
20 SOLUTION ORAL 3 TIMES DAILY
Status: DISCONTINUED | OUTPATIENT
Start: 2022-05-13 | End: 2022-05-15 | Stop reason: HOSPADM

## 2022-05-13 RX ORDER — DEXTROSE, SODIUM CHLORIDE, AND POTASSIUM CHLORIDE 5; .45; .15 G/100ML; G/100ML; G/100ML
INJECTION INTRAVENOUS CONTINUOUS
Status: DISCONTINUED | OUTPATIENT
Start: 2022-05-13 | End: 2022-05-14

## 2022-05-13 RX ORDER — SODIUM CHLORIDE 0.9 % (FLUSH) 0.9 %
5-40 SYRINGE (ML) INJECTION EVERY 12 HOURS SCHEDULED
Status: DISCONTINUED | OUTPATIENT
Start: 2022-05-13 | End: 2022-05-15 | Stop reason: HOSPADM

## 2022-05-13 RX ORDER — 0.9 % SODIUM CHLORIDE 0.9 %
1000 INTRAVENOUS SOLUTION INTRAVENOUS ONCE
Status: COMPLETED | OUTPATIENT
Start: 2022-05-13 | End: 2022-05-13

## 2022-05-13 RX ORDER — SODIUM CHLORIDE 0.9 % (FLUSH) 0.9 %
5-40 SYRINGE (ML) INJECTION PRN
Status: DISCONTINUED | OUTPATIENT
Start: 2022-05-13 | End: 2022-05-15 | Stop reason: HOSPADM

## 2022-05-13 RX ORDER — SODIUM CHLORIDE 0.9 % (FLUSH) 0.9 %
10 SYRINGE (ML) INJECTION PRN
Status: DISCONTINUED | OUTPATIENT
Start: 2022-05-13 | End: 2022-05-15 | Stop reason: HOSPADM

## 2022-05-13 RX ORDER — DICYCLOMINE HYDROCHLORIDE 10 MG/ML
20 INJECTION INTRAMUSCULAR 4 TIMES DAILY
Status: DISCONTINUED | OUTPATIENT
Start: 2022-05-13 | End: 2022-05-15 | Stop reason: HOSPADM

## 2022-05-13 RX ORDER — MORPHINE SULFATE 2 MG/ML
2 INJECTION, SOLUTION INTRAMUSCULAR; INTRAVENOUS ONCE
Status: COMPLETED | OUTPATIENT
Start: 2022-05-13 | End: 2022-05-13

## 2022-05-13 RX ORDER — PANTOPRAZOLE SODIUM 40 MG/1
40 TABLET, DELAYED RELEASE ORAL 2 TIMES DAILY PRN
COMMUNITY

## 2022-05-13 RX ORDER — MORPHINE SULFATE 4 MG/ML
4 INJECTION, SOLUTION INTRAMUSCULAR; INTRAVENOUS
Status: DISCONTINUED | OUTPATIENT
Start: 2022-05-13 | End: 2022-05-15 | Stop reason: HOSPADM

## 2022-05-13 RX ORDER — ONDANSETRON 4 MG/1
4 TABLET, ORALLY DISINTEGRATING ORAL EVERY 8 HOURS PRN
Status: DISCONTINUED | OUTPATIENT
Start: 2022-05-13 | End: 2022-05-15 | Stop reason: HOSPADM

## 2022-05-13 RX ORDER — MORPHINE SULFATE 2 MG/ML
2 INJECTION, SOLUTION INTRAMUSCULAR; INTRAVENOUS
Status: DISCONTINUED | OUTPATIENT
Start: 2022-05-13 | End: 2022-05-15 | Stop reason: HOSPADM

## 2022-05-13 RX ORDER — ONDANSETRON 2 MG/ML
4 INJECTION INTRAMUSCULAR; INTRAVENOUS ONCE
Status: COMPLETED | OUTPATIENT
Start: 2022-05-13 | End: 2022-05-13

## 2022-05-13 RX ORDER — KETOROLAC TROMETHAMINE 15 MG/ML
15 INJECTION, SOLUTION INTRAMUSCULAR; INTRAVENOUS ONCE
Status: COMPLETED | OUTPATIENT
Start: 2022-05-13 | End: 2022-05-13

## 2022-05-13 RX ORDER — FUROSEMIDE 40 MG/1
40 TABLET ORAL DAILY
Status: DISCONTINUED | OUTPATIENT
Start: 2022-05-13 | End: 2022-05-14

## 2022-05-13 RX ORDER — POTASSIUM CHLORIDE 7.45 MG/ML
10 INJECTION INTRAVENOUS PRN
Status: DISCONTINUED | OUTPATIENT
Start: 2022-05-13 | End: 2022-05-13

## 2022-05-13 RX ORDER — LANOLIN ALCOHOL/MO/W.PET/CERES
3 CREAM (GRAM) TOPICAL NIGHTLY PRN
Status: DISCONTINUED | OUTPATIENT
Start: 2022-05-13 | End: 2022-05-15 | Stop reason: HOSPADM

## 2022-05-13 RX ORDER — ONDANSETRON 2 MG/ML
4 INJECTION INTRAMUSCULAR; INTRAVENOUS EVERY 6 HOURS PRN
Status: DISCONTINUED | OUTPATIENT
Start: 2022-05-13 | End: 2022-05-15 | Stop reason: HOSPADM

## 2022-05-13 RX ORDER — SPIRONOLACTONE 100 MG/1
100 TABLET, FILM COATED ORAL DAILY
Status: DISCONTINUED | OUTPATIENT
Start: 2022-05-13 | End: 2022-05-14

## 2022-05-13 RX ORDER — 0.9 % SODIUM CHLORIDE 0.9 %
7 INTRAVENOUS SOLUTION INTRAVENOUS ONCE
Status: COMPLETED | OUTPATIENT
Start: 2022-05-13 | End: 2022-05-13

## 2022-05-13 RX ORDER — ACETAMINOPHEN 650 MG/1
650 SUPPOSITORY RECTAL EVERY 6 HOURS PRN
Status: DISCONTINUED | OUTPATIENT
Start: 2022-05-13 | End: 2022-05-15 | Stop reason: HOSPADM

## 2022-05-13 RX ADMIN — POTASSIUM CHLORIDE, DEXTROSE MONOHYDRATE AND SODIUM CHLORIDE: 150; 5; 450 INJECTION, SOLUTION INTRAVENOUS at 22:09

## 2022-05-13 RX ADMIN — IOPAMIDOL 75 ML: 755 INJECTION, SOLUTION INTRAVENOUS at 03:29

## 2022-05-13 RX ADMIN — POTASSIUM CHLORIDE, DEXTROSE MONOHYDRATE AND SODIUM CHLORIDE: 150; 5; 450 INJECTION, SOLUTION INTRAVENOUS at 11:07

## 2022-05-13 RX ADMIN — MORPHINE SULFATE 2 MG: 2 INJECTION, SOLUTION INTRAMUSCULAR; INTRAVENOUS at 05:26

## 2022-05-13 RX ADMIN — KETOROLAC TROMETHAMINE 15 MG: 15 INJECTION, SOLUTION INTRAMUSCULAR; INTRAVENOUS at 05:25

## 2022-05-13 RX ADMIN — MORPHINE SULFATE 2 MG: 2 INJECTION, SOLUTION INTRAMUSCULAR; INTRAVENOUS at 19:32

## 2022-05-13 RX ADMIN — SODIUM CHLORIDE, PRESERVATIVE FREE 5 ML: 5 INJECTION INTRAVENOUS at 11:08

## 2022-05-13 RX ADMIN — ENOXAPARIN SODIUM 40 MG: 100 INJECTION SUBCUTANEOUS at 11:24

## 2022-05-13 RX ADMIN — SODIUM CHLORIDE, PRESERVATIVE FREE 10 ML: 5 INJECTION INTRAVENOUS at 03:51

## 2022-05-13 RX ADMIN — SODIUM CHLORIDE 70 ML: 9 INJECTION, SOLUTION INTRAVENOUS at 03:38

## 2022-05-13 RX ADMIN — FUROSEMIDE 40 MG: 40 TABLET ORAL at 19:32

## 2022-05-13 RX ADMIN — MORPHINE SULFATE 2 MG: 2 INJECTION, SOLUTION INTRAMUSCULAR; INTRAVENOUS at 03:16

## 2022-05-13 RX ADMIN — SPIRONOLACTONE 100 MG: 100 TABLET ORAL at 19:32

## 2022-05-13 RX ADMIN — Medication 3 MG: at 23:13

## 2022-05-13 RX ADMIN — SODIUM CHLORIDE 1000 ML: 9 INJECTION, SOLUTION INTRAVENOUS at 03:17

## 2022-05-13 RX ADMIN — CEFTRIAXONE SODIUM 1000 MG: 1 INJECTION, POWDER, FOR SOLUTION INTRAMUSCULAR; INTRAVENOUS at 16:06

## 2022-05-13 RX ADMIN — LACTULOSE 20 G: 20 SOLUTION ORAL at 22:01

## 2022-05-13 RX ADMIN — MORPHINE SULFATE 2 MG: 2 INJECTION, SOLUTION INTRAMUSCULAR; INTRAVENOUS at 11:26

## 2022-05-13 RX ADMIN — DICYCLOMINE HYDROCHLORIDE 20 MG: 10 INJECTION, SOLUTION INTRAMUSCULAR at 21:58

## 2022-05-13 RX ADMIN — MORPHINE SULFATE 2 MG: 2 INJECTION, SOLUTION INTRAMUSCULAR; INTRAVENOUS at 16:08

## 2022-05-13 RX ADMIN — DICYCLOMINE HYDROCHLORIDE 20 MG: 10 INJECTION, SOLUTION INTRAMUSCULAR at 11:25

## 2022-05-13 RX ADMIN — ONDANSETRON 4 MG: 2 INJECTION INTRAMUSCULAR; INTRAVENOUS at 03:15

## 2022-05-13 ASSESSMENT — ENCOUNTER SYMPTOMS
EYES NEGATIVE: 1
RESPIRATORY NEGATIVE: 1
DIARRHEA: 0
ABDOMINAL PAIN: 1
CONSTIPATION: 0
NAUSEA: 1
ALLERGIC/IMMUNOLOGIC NEGATIVE: 1
VOMITING: 1

## 2022-05-13 ASSESSMENT — PAIN DESCRIPTION - LOCATION
LOCATION: ABDOMEN

## 2022-05-13 ASSESSMENT — PAIN SCALES - GENERAL
PAINLEVEL_OUTOF10: 9
PAINLEVEL_OUTOF10: 9
PAINLEVEL_OUTOF10: 8
PAINLEVEL_OUTOF10: 9
PAINLEVEL_OUTOF10: 9

## 2022-05-13 ASSESSMENT — LIFESTYLE VARIABLES: HOW OFTEN DO YOU HAVE A DRINK CONTAINING ALCOHOL: NEVER
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
## 2022-05-13 NOTE — ED PROVIDER NOTES
eMERGENCY dEPARTMENT eNCOUnter      Pt Name: Reina Tolentino  MRN: [id number]  [place] 1985  Date of evaluation: 5/13/2022      CHIEF COMPLAINT       Chief Complaint   Patient presents with    Abdominal Pain          HISTORY OF PRESENT ILLNESS    Reina Tolentino is a 39 y.o. female who presents for evaluation of cute onset of epigastric and generalized abdominal pain. Patient states she is also been nauseated but she has not had any diarrhea or constipation. Patient does have a history of cirrhosis of the liver for which she has been off of alcohol for the last 2 months. Patient was reported to have a temp of 100.3 by EMS but here she is 99.1. She has not been vaccinated. REVIEW OF SYSTEMS       Review of Systems   Constitutional: Positive for fever. HENT: Negative. Eyes: Negative. Respiratory: Negative. Cardiovascular: Negative. Gastrointestinal: Positive for abdominal pain, nausea and vomiting. Negative for constipation and diarrhea. Endocrine: Negative. Genitourinary: Negative. Musculoskeletal: Negative. Skin: Negative. Allergic/Immunologic: Negative. Neurological: Negative. Hematological: Negative. Psychiatric/Behavioral: Negative. PAST MEDICAL HISTORY    has a past medical history of Anxiety, Arthritis, Closed head injury, CVS disease, HPV (human papilloma virus) anogenital infection, and Hypertension. SURGICAL HISTORY      has a past surgical history that includes LEEP; Gynecologic cryosurgery; Endoscopy, colon, diagnostic; Colonoscopy; and Tonsillectomy. CURRENT MEDICATIONS       Discharge Medication List as of 5/15/2022  1:57 PM      CONTINUE these medications which have NOT CHANGED    Details   pantoprazole (PROTONIX) 40 MG tablet Take 40 mg by mouth 2 times daily as neededHistorical Med             ALLERGIES     is allergic to seasonal.    FAMILY HISTORY     She indicated that her mother is alive. She indicated that her father is alive.      family Emergency Department Physician who either signs or Co-signs this chart in the absence of a cardiologist.        Not indicated unless otherwise documented above    LABS:  Results for orders placed or performed during the hospital encounter of 05/13/22   Culture, Blood 1    Specimen: Blood   Result Value Ref Range    Specimen Description . BLOOD     Special Requests LT AC 9ML     Culture NO GROWTH 3 DAYS    Culture, Blood 1    Specimen: Blood   Result Value Ref Range    Specimen Description . BLOOD     Special Requests LT ARM 10ML     Culture NO GROWTH 3 DAYS    Culture, Body Fluid    Specimen: Ascitic Fluid; Body Fluid   Result Value Ref Range    Specimen Description . ASCITIC FLUID     Direct Exam NO NEUTROPHILS SEEN     Direct Exam NO BACTERIA SEEN     Direct Exam       Gram stain made from cytocentrifuged specimen. Organisms and cells will be concentrated.     Culture NO GROWTH 3 DAYS    Lactate, Sepsis   Result Value Ref Range    Lactic Acid, Sepsis 1.4 0.5 - 1.9 mmol/L   Comprehensive Metabolic Panel   Result Value Ref Range    Glucose 85 70 - 99 mg/dL    BUN 6 6 - 20 mg/dL    CREATININE 0.50 0.50 - 0.90 mg/dL    Calcium 9.0 8.6 - 10.4 mg/dL    Sodium 134 (L) 135 - 144 mmol/L    Potassium 3.7 3.7 - 5.3 mmol/L    Chloride 103 98 - 107 mmol/L    CO2 18 (L) 20 - 31 mmol/L    Anion Gap 13 9 - 17 mmol/L    Alkaline Phosphatase 121 (H) 35 - 104 U/L    ALT 24 5 - 33 U/L    AST 87 (H) <32 U/L    Total Bilirubin 3.20 (H) 0.3 - 1.2 mg/dL    Total Protein 7.3 6.4 - 8.3 g/dL    Albumin 3.3 (L) 3.5 - 5.2 g/dL    Albumin/Globulin Ratio 0.8 (L) 1.0 - 2.5    GFR Non-African American >60 >60 mL/min    GFR African American >60 >60 mL/min    GFR Comment         Lipase   Result Value Ref Range    Lipase 53 13 - 60 U/L   HCG Qualitative, Serum   Result Value Ref Range    hCG Qual NEGATIVE NEGATIVE   CBC with Auto Differential   Result Value Ref Range    WBC 6.4 3.5 - 11.0 k/uL    RBC 3.42 (L) 4.0 - 5.2 m/uL    Hemoglobin 9.8 (L) 12.0 - 16.0 g/dL    Hematocrit 29.3 (L) 36 - 46 %    MCV 85.6 80 - 100 fL    MCH 28.7 26 - 34 pg    MCHC 33.5 31 - 37 g/dL    RDW 17.7 (H) 12.5 - 15.4 %    Platelets 90 (L) 770 - 450 k/uL    MPV 6.9 6.0 - 12.0 fL    Seg Neutrophils 70 (H) 36 - 66 %    Lymphocytes 17 (L) 24 - 44 %    Monocytes 12 (H) 2 - 11 %    Eosinophils % 0 (L) 1 - 4 %    Basophils 1 0 - 2 %    Segs Absolute 4.50 1.8 - 7.7 k/uL    Absolute Lymph # 1.10 1.0 - 4.8 k/uL    Absolute Mono # 0.80 0.1 - 1.2 k/uL    Absolute Eos # 0.00 0.0 - 0.4 k/uL    Basophils Absolute 0.10 0.0 - 0.2 k/uL   Lactate, Sepsis   Result Value Ref Range    Lactic Acid, Sepsis 1.3 0.5 - 1.9 mmol/L   Cell Count with Differential, Body Fluid   Result Value Ref Range    WBC, Fluid 60 /mm3    RBC, Fluid 3,000 /mm3    Specimen Type . PLEURAL FLUID     Neutrophil Count, Fluid 1 %    Lymphocytes, Body Fluid 30 %    Other Cells, Fluid MONOCYTES AND MESOTHELIAL CELLS %   Amylase, body fluid   Result Value Ref Range    Specimen Type . PLEURAL FLUID     Amylase, Fluid 26 U/L   Albumin, Body Fluid   Result Value Ref Range    Specimen Type . PLEURAL FLUID     Albumin, Fluid 0.6 g/dL   Protein, body fluid   Result Value Ref Range    Specimen Type . PLEURAL FLUID     Total Protein, Body Fluid 1.0 g/dL   Lactate dehydrogenase, body fluid   Result Value Ref Range    Specimen Type . PLEURAL FLUID     LD, Fluid 40 U/L   Glucose, body fluid   Result Value Ref Range    Specimen Type . PLEURAL FLUID     Glucose, Fluid 91 mg/dL   Cytology, non gyne   Result Value Ref Range    Specimen Description . PARACENTESIS FLUID     Case Number: [id number]    Protime-INR   Result Value Ref Range    Protime 21.1 (H) 11.5 - 14.2 sec    INR 1.8    APTT   Result Value Ref Range    PTT 59.1 (H) 23.9 - 33.8 sec   Urinalysis with Reflex to Culture   Result Value Ref Range    Color, UA Dark Yellow (A) Yellow    Turbidity UA SLIGHTLY CLOUDY (A) Clear    Glucose, Ur NEGATIVE NEGATIVE    Bilirubin Urine (A) NEGATIVE     Presumptive positive. Unable to confirm due to unavailability of reagent.     Ketones, Urine NEGATIVE NEGATIVE    Specific Gravity, UA 1.082 (H) 1.005 - 1.030    Urine Hgb NEGATIVE NEGATIVE    pH, UA 5.5 5.0 - 8.0    Protein, UA NEGATIVE NEGATIVE    Urobilinogen, Urine Normal Normal    Nitrite, Urine NEGATIVE NEGATIVE    Leukocyte Esterase, Urine NEGATIVE NEGATIVE   Microscopic Urinalysis   Result Value Ref Range    -          WBC, UA 2 TO 5 0 - 5 /HPF    RBC, UA 0 TO 2 0 - 2 /HPF    Epithelial Cells UA 10 TO 20 0 - 5 /HPF    Mucus, UA 2+ (A) None    Amorphous, UA 1+ (A) None   Amylase   Result Value Ref Range    Amylase 51 28 - 100 U/L   Comprehensive Metabolic Panel w/ Reflex to MG   Result Value Ref Range    Glucose 93 70 - 99 mg/dL    BUN 6 6 - 20 mg/dL    CREATININE 0.61 0.50 - 0.90 mg/dL    Bun/Cre Ratio 10 9 - 20    Calcium 7.8 (L) 8.6 - 10.4 mg/dL    Sodium 131 (L) 135 - 144 mmol/L    Potassium 3.7 3.7 - 5.3 mmol/L    Chloride 100 98 - 107 mmol/L    CO2 22 20 - 31 mmol/L    Anion Gap 9 9 - 17 mmol/L    Alkaline Phosphatase 87 35 - 104 U/L    ALT 25 5 - 33 U/L    AST 89 (H) <32 U/L    Total Bilirubin 1.97 (H) 0.3 - 1.2 mg/dL    Total Protein 5.6 (L) 6.4 - 8.3 g/dL    Albumin 2.7 (L) 3.5 - 5.2 g/dL    GFR Non-African American >60 >60 mL/min    GFR African American >60 >60 mL/min    GFR Comment         Lipase   Result Value Ref Range    Lipase 31 13 - 60 U/L   Magnesium   Result Value Ref Range    Magnesium 1.4 (L) 1.6 - 2.6 mg/dL   Phosphorus   Result Value Ref Range    Phosphorus 3.0 2.6 - 4.5 mg/dL   AFP Tumor Marker   Result Value Ref Range    AFP (Alpha Fetoprotein) 4.4 <8.4 ug/L   CBC with Auto Differential   Result Value Ref Range    WBC 3.6 3.5 - 11.3 k/uL    RBC 3.06 (L) 3.95 - 5.11 m/uL    Hemoglobin 8.6 (L) 11.9 - 15.1 g/dL    Hematocrit 27.7 (L) 36.3 - 47.1 %    MCV 90.5 82.6 - 102.9 fL    MCH 28.1 25.2 - 33.5 pg    MCHC 31.0 28.4 - 34.8 g/dL    RDW 17.2 (H) 11.8 - 14.4 %    Platelets 62 (L) 544 - 453 k/uL MPV 9.0 8.1 - 13.5 fL    NRBC Automated 0.0 0.0 per 100 WBC    Seg Neutrophils 53 36 - 66 %    Lymphocytes 38 24 - 44 %    Monocytes 8 (H) 1 - 7 %    Eosinophils % 1 1 - 4 %    Basophils 0 %    Immature Granulocytes 0 0 %    Segs Absolute 1.90 1.8 - 7.7 k/uL    Absolute Lymph # 1.37 1.0 - 4.8 k/uL    Absolute Mono # 0.29 0.2 - 0.8 k/uL    Absolute Eos # 0.04 0.0 - 0.4 k/uL    Basophils Absolute 0.00 0.0 - 0.2 k/uL    Absolute Immature Granulocyte 0.00 0.00 - 0.30 k/uL    Morphology INCREASED BANDS PRESENT    Comprehensive Metabolic Panel w/ Reflex to MG   Result Value Ref Range    Glucose 87 70 - 99 mg/dL    BUN 6 6 - 20 mg/dL    CREATININE 0.49 (L) 0.50 - 0.90 mg/dL    Bun/Cre Ratio 12 9 - 20    Calcium 7.8 (L) 8.6 - 10.4 mg/dL    Sodium 136 135 - 144 mmol/L    Potassium 3.3 (L) 3.7 - 5.3 mmol/L    Chloride 105 98 - 107 mmol/L    CO2 24 20 - 31 mmol/L    Anion Gap 7 (L) 9 - 17 mmol/L    Alkaline Phosphatase 110 (H) 35 - 104 U/L    ALT 24 5 - 33 U/L    AST 93 (H) <32 U/L    Total Bilirubin 1.62 (H) 0.3 - 1.2 mg/dL    Total Protein 5.9 (L) 6.4 - 8.3 g/dL    Albumin 2.6 (L) 3.5 - 5.2 g/dL    GFR Non-African American >60 >60 mL/min    GFR African American >60 >60 mL/min    GFR Comment         Magnesium   Result Value Ref Range    Magnesium 1.8 1.6 - 2.6 mg/dL       Not indicated unless otherwise documented above    RADIOLOGY:   I reviewed the radiologist interpretations:  MRI ABDOMEN W WO CONTRAST MRCP   Final Result   1. Hepatomegaly with cirrhosis. In segment 7, there is a small 8 mm lesion   with characteristics suspicious for developing hepatocellular carcinoma   (LR-4). This may be too small to biopsy. Correlate with AFP. 2. Gallbladder is distended but otherwise unremarkable. No significant   biliary duct dilatation or evidence of choledocholithiasis. 3. Stigmata of portal hypertension including splenomegaly and mild-moderate   ascites.          IR US GUIDED PARACENTESIS   Final Result   Successful ultrasound-guided paracentesis. US GALLBLADDER RUQ   Final Result   Cirrhotic liver. Gallbladder sludge with wall thickness upper limits of normal.  No definite   findings of acute cholecystitis. Mildly dilated common bile duct. Further assessment with MRCP or ERCP can be   considered if clinically indicated. Right kidney appears sonographically within normal limits. CT ABDOMEN PELVIS W IV CONTRAST Additional Contrast? None   Final Result   Cirrhotic liver morphology with evidence of portal hypertension including   splenomegaly and recanalization of the umbilical vein. Mild ascites. Wall thickening and edema of the stomach suggesting gastritis. Pericholecystic fluid, possibly secondary to ascites. Otherwise unremarkable   appearance of the gallbladder. No appreciable cholelithiasis.              Not indicated unless otherwise documented above    EMERGENCY DEPARTMENT COURSE:     The patient was given the following medications:  Orders Placed This Encounter   Medications    0.9 % sodium chloride bolus    ondansetron (ZOFRAN) injection 4 mg    morphine (PF) injection 2 mg    iopamidol (ISOVUE-370) 76 % injection 75 mL    DISCONTD: sodium chloride flush 0.9 % injection 5-40 mL    0.9 % sodium chloride bolus    ketorolac (TORADOL) injection 15 mg    morphine (PF) injection 2 mg    DISCONTD: 0.9 % sodium chloride infusion    DISCONTD: acetaminophen (TYLENOL) tablet 650 mg    DISCONTD: acetaminophen (TYLENOL) suppository 650 mg    DISCONTD: dextrose 5 % and 0.45 % NaCl with KCl 20 mEq infusion    DISCONTD: enoxaparin (LOVENOX) injection 40 mg     Order Specific Question:   Indication of Use     Answer:   Prophylaxis-DVT/PE    DISCONTD: magnesium sulfate 1000 mg in dextrose 5% 100 mL IVPB    DISCONTD: ondansetron (ZOFRAN-ODT) disintegrating tablet 4 mg    DISCONTD: ondansetron (ZOFRAN) injection 4 mg    DISCONTD: polyethylene glycol (GLYCOLAX) packet 17 g    DISCONTD: potassium chloride (KLOR-CON M) extended release tablet 40 mEq    DISCONTD: potassium bicarb-citric acid (EFFER-K) effervescent tablet 40 mEq    DISCONTD: potassium chloride 10 mEq/100 mL IVPB (Peripheral Line)    DISCONTD: sodium chloride flush 0.9 % injection 5-40 mL    DISCONTD: sodium chloride flush 0.9 % injection 10 mL    DISCONTD: dicyclomine (BENTYL) injection 20 mg    DISCONTD: morphine (PF) injection 2 mg    DISCONTD: morphine sulfate (PF) injection 4 mg    DISCONTD: cefTRIAXone (ROCEPHIN) 1000 mg IVPB in 50 mL D5W minibag     Order Specific Question:   Antimicrobial Indications     Answer:   Intra-Abdominal Infection    DISCONTD: furosemide (LASIX) tablet 40 mg    DISCONTD: spironolactone (ALDACTONE) tablet 100 mg    DISCONTD: lactulose (CHRONULAC) 10 GM/15ML solution 20 g    DISCONTD: melatonin tablet 3 mg    gadoteridol (PROHANCE) injection 15 mL    DISCONTD: furosemide (LASIX) tablet 20 mg    DISCONTD: spironolactone (ALDACTONE) tablet 50 mg    DISCONTD: potassium chloride (KLOR-CON M) extended release tablet 40 mEq    DISCONTD: potassium bicarb-citric acid (EFFER-K) effervescent tablet 40 mEq    DISCONTD: potassium chloride 10 mEq/100 mL IVPB (Peripheral Line)    DISCONTD: cefdinir (OMNICEF) capsule 300 mg     Order Specific Question:   Antimicrobial Indications     Answer:   Intra-Abdominal Infection    cefdinir (OMNICEF) 300 MG capsule     Sig: Take 1 capsule by mouth every 12 hours for 8 doses     Dispense:  8 capsule     Refill:  0    furosemide (LASIX) 20 MG tablet     Sig: Take 1 tablet by mouth daily     Dispense:  60 tablet     Refill:  3    spironolactone (ALDACTONE) 50 MG tablet     Sig: Take 1 tablet by mouth daily     Dispense:  30 tablet     Refill:  3    lactulose (CHRONULAC) 10 GM/15ML solution     Sig: Take 30 mLs by mouth 3 times daily     Dispense:  2700 mL     Refill:  1        Vitals:    Vitals:    05/15/22 0300 05/15/22 0400 05/15/22 0800 05/15/22 1200   BP:  (!) 99/59 (!) 91/53 97/60   Pulse: 80 80 86 84   Resp:  16 16 16   Temp:  98.7 °F (37.1 °C) 97.9 °F (36.6 °C) 97.3 °F (36.3 °C)   TempSrc:  Oral Temporal Temporal   SpO2:  98% 100% 100%   Weight:       Height:         -------------------------  BP 97/60   Pulse 84   Temp 97.3 °F (36.3 °C) (Temporal)   Resp 16   Ht 5' 7\" (1.702 m)   Wt 69.7 kg (153 lb 11.2 oz)   LMP 04/03/2022 (Approximate)   SpO2 100%   BMI 24.07 kg/m²         I have reviewed the disposition diagnosis with the patient and or their family/guardian. I have answered their questions and given discharge instructions. They voiced understanding of these instructions and did not have any further questions or complaints. CRITICAL CARE:    None    CONSULTS:    None    PROCEDURES:    None      OARRS Report if indicated             FINAL IMPRESSION      1. Epigastric pain    2. Alcoholic cirrhosis of liver with ascites ([facility] [address]          DISPOSITION/PLAN   DISPOSITION Decision To Admit    I have reviewed the disposition diagnosis with the patient and or their family/guardian. I have answered their questions and given discharge instructions. They voiced understanding of these instructions and did not have any further questions or complaints. Reevaluation: Despite pain medications patient still having 6 or 7 out of 10 abdominal pain still feeling nauseated and uncomfortable. Does not look like she has any surgical issue at this point in time however has cirrhosis may need to have a biliary scan of some sort and continuing fluids and pain medications and so given this we are going to put the patient in the hospital.  I did speak to [facility] [place] for the admission at [facility].   PATIENT REFERRED TO:  Tammie Melo MD  [address] [id number]  [facility] [address]  [phone]    In 1 week      Harrison Hare MD  [address] [facility] [address] [place] [facility] [address] [phone]    Schedule an appointment as soon as possible for a

## 2022-05-13 NOTE — PROGRESS NOTES
Transitions of Care Pharmacy Service   Medication Review    The patient's list of current home medications has been reviewed. Source(s) of information: spoke to patient and sure scripts     Based on information provided by the above source(s), I have updated the patient's home med list as described below. I changed or updated the following medications on the patient's home medication list:  Discontinued levETIRAcetam (KEPPRA) 500 MG tablet  loratadine (CLARITIN) 10 MG tablet     Added pantoprazole (PROTONIX) 40 MG tablet     Adjusted   None      Other Notes Patient stated that she took her self off levETIRAcetam (KEPPRA) 500 MG tablet because she hasn't had any seizure like episodes for a while. She refilled prescriptions for Lasix 20mg daily and Spironolactone 50mg daily in April but states she stopped taking these as directed by her physician           Please feel free to call me with any questions about this encounter. Thank you. This note will be reviewed and co-signed by the [facility] Pharmacist. The pharmacist will review inpatient orders and contact the physician about any discrepancies. Dellia Hamman, pharmacy technician  Transitions [facility] Pharmacy Service  Phone:  [phone]  Fax: [phone]      Electronically signed by Dellia Hamman on 5/13/2022 at 11:40 AM         Prior to Admission medications    Medication Sig Start Date End Date Taking?  Authorizing Provider   pantoprazole (PROTONIX) 40 MG tablet Take 40 mg by mouth 2 times daily as needed

## 2022-05-13 NOTE — ED NOTES
Call to [address] to advise transport is here and pt is leaving for room 2037.   Report is given to transport team.       Zahra Dorsey RN  05/13/22 3375

## 2022-05-13 NOTE — ED NOTES
Pt presents to ED via private auto with c/o abdominal pain, onset this morning. Pt states she was moving furniture yesterday and she may have over did it. Pt states she was recently diagnosed  with cirrhosis of the liver. Pt has low grade fever. Abdominal tender to palpation. Pt states last BM was yesterday.       Karen Auguste RN  05/13/22 3911

## 2022-05-13 NOTE — FLOWSHEET NOTE
Pt tolerated procedure without distress.  800 ml of clear javon ascitic fluid removed and collected for lab specimens  Dressing applied to procedure site pt returned to room in nad

## 2022-05-13 NOTE — CARE COORDINATION
Case Management Initial Discharge Plan  Narcisa Tucker,             Met with:patient to discuss discharge plans. Information verified: address, contacts, phone number, , insurance Yes  Insurance Provider: [employer]    Emergency Contact/Next of Kin name & number: Yodit/parent   [phone]  Who are involved in patient's support system? family    PCP: Chinedu Santos MD  Date of last visit: 3/28      Discharge Planning    Living Arrangements:        Home has 1 stories  2 stairs to climb to get into front door, 0stairs to climb to reach second floor  Location of bedroom/bathroom in home main    Patient able to perform ADL's:Independent    Current Services (outpatient & in home) none  DME equipment: 0  DME provider: 0    Is patient receiving oral anticoagulation therapy? No    If indicated:   Physician managing anticoagulation treatment:   Where does patient obtain lab work for ATC treatment? Does patient have any issues/concerns obtaining medications? No  If yes, what are patient's concerns? Is there a preferred Pharmacy after hours or on weekends? Yes    If yes, which pharmacy? [facility] on [address]  Potential Assistance Needed:       Patient agreeable to home care: No  [place] of choice provided:  n/a    Prior SNF/Rehab Placement and Facility: n/a  Agreeable to SNF/Rehab: No  [place] of choice provided: n/a     Evaluation: no    Expected Discharge date:       Patient expects to be discharged to: If home: is the family and/or caregiver wiling & able to provide support at home? yes  Who will be providing this support?  Family and self    Follow Up Appointment: Best Day/ Time:      Transportation provider: family  Transportation arrangements needed for discharge: No    Readmission Risk              Risk of Unplanned Readmission:  9             Does patient have a readmission risk score greater than 14?: No  If yes, follow-up appointment must be made within 7 days of discharge. Goals of Care:       Educated patient on transitional options, provided freedom of choice and are agreeable with plan      Discharge Plan: Epigastric pain  Patient lives with father and fiance in a 1 story home with 2 steps to enter. Declines any skilled needs. Independent and works. No DME    GI consulted for cirrhosis  May need paracentesis tomorrow. Continue to follow.                Electronically signed by Sanchez Palma RN on 5/13/22 at 3:56 PM EDT

## 2022-05-14 ENCOUNTER — APPOINTMENT (OUTPATIENT)
Dept: MRI IMAGING | Age: 37
End: 2022-05-14
Payer: MEDICARE

## 2022-05-14 LAB
AFP: 4.4 UG/L
ALBUMIN SERPL-MCNC: 2.7 G/DL (ref 3.5–5.2)
ALP BLD-CCNC: 87 U/L (ref 35–104)
ALT SERPL-CCNC: 25 U/L (ref 5–33)
AMYLASE: 51 U/L (ref 28–100)
ANION GAP SERPL CALCULATED.3IONS-SCNC: 9 MMOL/L (ref 9–17)
AST SERPL-CCNC: 89 U/L
BILIRUB SERPL-MCNC: 1.97 MG/DL (ref 0.3–1.2)
BUN BLDV-MCNC: 6 MG/DL (ref 6–20)
BUN/CREAT BLD: 10 (ref 9–20)
CALCIUM SERPL-MCNC: 7.8 MG/DL (ref 8.6–10.4)
CHLORIDE BLD-SCNC: 100 MMOL/L (ref 98–107)
CO2: 22 MMOL/L (ref 20–31)
CREAT SERPL-MCNC: 0.61 MG/DL (ref 0.5–0.9)
GFR AFRICAN AMERICAN: >60 ML/MIN
GFR NON-AFRICAN AMERICAN: >60 ML/MIN
GFR SERPL CREATININE-BSD FRML MDRD: ABNORMAL ML/MIN/{1.73_M2}
GLUCOSE BLD-MCNC: 93 MG/DL (ref 70–99)
LIPASE: 31 U/L (ref 13–60)
MAGNESIUM: 1.4 MG/DL (ref 1.6–2.6)
PHOSPHORUS: 3 MG/DL (ref 2.6–4.5)
POTASSIUM SERPL-SCNC: 3.7 MMOL/L (ref 3.7–5.3)
SODIUM BLD-SCNC: 131 MMOL/L (ref 135–144)
TOTAL PROTEIN: 5.6 G/DL (ref 6.4–8.3)

## 2022-05-14 PROCEDURE — 96366 THER/PROPH/DIAG IV INF ADDON: CPT

## 2022-05-14 PROCEDURE — 82150 ASSAY OF AMYLASE: CPT

## 2022-05-14 PROCEDURE — 96365 THER/PROPH/DIAG IV INF INIT: CPT

## 2022-05-14 PROCEDURE — 82105 ALPHA-FETOPROTEIN SERUM: CPT

## 2022-05-14 PROCEDURE — 6360000002 HC RX W HCPCS: Performed by: NURSE PRACTITIONER

## 2022-05-14 PROCEDURE — 74183 MRI ABD W/O CNTR FLWD CNTR: CPT

## 2022-05-14 PROCEDURE — 96367 TX/PROPH/DG ADDL SEQ IV INF: CPT

## 2022-05-14 PROCEDURE — 83735 ASSAY OF MAGNESIUM: CPT

## 2022-05-14 PROCEDURE — 2580000003 HC RX 258: Performed by: INTERNAL MEDICINE

## 2022-05-14 PROCEDURE — 83690 ASSAY OF LIPASE: CPT

## 2022-05-14 PROCEDURE — 6360000004 HC RX CONTRAST MEDICATION: Performed by: NURSE PRACTITIONER

## 2022-05-14 PROCEDURE — 96361 HYDRATE IV INFUSION ADD-ON: CPT

## 2022-05-14 PROCEDURE — 6360000002 HC RX W HCPCS: Performed by: INTERNAL MEDICINE

## 2022-05-14 PROCEDURE — 6370000000 HC RX 637 (ALT 250 FOR IP): Performed by: NURSE PRACTITIONER

## 2022-05-14 PROCEDURE — 6370000000 HC RX 637 (ALT 250 FOR IP): Performed by: INTERNAL MEDICINE

## 2022-05-14 PROCEDURE — G0378 HOSPITAL OBSERVATION PER HR: HCPCS

## 2022-05-14 PROCEDURE — 99232 SBSQ HOSP IP/OBS MODERATE 35: CPT | Performed by: INTERNAL MEDICINE

## 2022-05-14 PROCEDURE — 96372 THER/PROPH/DIAG INJ SC/IM: CPT

## 2022-05-14 PROCEDURE — 84100 ASSAY OF PHOSPHORUS: CPT

## 2022-05-14 PROCEDURE — 36415 COLL VENOUS BLD VENIPUNCTURE: CPT

## 2022-05-14 PROCEDURE — 80053 COMPREHEN METABOLIC PANEL: CPT

## 2022-05-14 PROCEDURE — APPSS30 APP SPLIT SHARED TIME 16-30 MINUTES: Performed by: NURSE PRACTITIONER

## 2022-05-14 PROCEDURE — 99225 PR SBSQ OBSERVATION CARE/DAY 25 MINUTES: CPT | Performed by: INTERNAL MEDICINE

## 2022-05-14 PROCEDURE — 2500000003 HC RX 250 WO HCPCS: Performed by: NURSE PRACTITIONER

## 2022-05-14 PROCEDURE — 2580000003 HC RX 258: Performed by: NURSE PRACTITIONER

## 2022-05-14 PROCEDURE — A9579 GAD-BASE MR CONTRAST NOS,1ML: HCPCS | Performed by: NURSE PRACTITIONER

## 2022-05-14 RX ORDER — FUROSEMIDE 20 MG/1
20 TABLET ORAL DAILY
Status: DISCONTINUED | OUTPATIENT
Start: 2022-05-15 | End: 2022-05-15 | Stop reason: HOSPADM

## 2022-05-14 RX ORDER — SPIRONOLACTONE 25 MG/1
50 TABLET ORAL DAILY
Status: DISCONTINUED | OUTPATIENT
Start: 2022-05-15 | End: 2022-05-15 | Stop reason: HOSPADM

## 2022-05-14 RX ADMIN — CEFTRIAXONE SODIUM 1000 MG: 1 INJECTION, POWDER, FOR SOLUTION INTRAMUSCULAR; INTRAVENOUS at 15:49

## 2022-05-14 RX ADMIN — LACTULOSE 20 G: 20 SOLUTION ORAL at 21:06

## 2022-05-14 RX ADMIN — MAGNESIUM SULFATE HEPTAHYDRATE 1000 MG: 1 INJECTION, SOLUTION INTRAVENOUS at 21:08

## 2022-05-14 RX ADMIN — SPIRONOLACTONE 100 MG: 100 TABLET ORAL at 11:50

## 2022-05-14 RX ADMIN — SODIUM CHLORIDE, PRESERVATIVE FREE 10 ML: 5 INJECTION INTRAVENOUS at 11:50

## 2022-05-14 RX ADMIN — DICYCLOMINE HYDROCHLORIDE 20 MG: 10 INJECTION, SOLUTION INTRAMUSCULAR at 06:00

## 2022-05-14 RX ADMIN — LACTULOSE 20 G: 20 SOLUTION ORAL at 15:38

## 2022-05-14 RX ADMIN — SODIUM CHLORIDE, PRESERVATIVE FREE 10 ML: 5 INJECTION INTRAVENOUS at 21:11

## 2022-05-14 RX ADMIN — FUROSEMIDE 40 MG: 40 TABLET ORAL at 11:50

## 2022-05-14 RX ADMIN — ENOXAPARIN SODIUM 40 MG: 100 INJECTION SUBCUTANEOUS at 11:50

## 2022-05-14 RX ADMIN — Medication 3 MG: at 22:56

## 2022-05-14 RX ADMIN — POTASSIUM CHLORIDE, DEXTROSE MONOHYDRATE AND SODIUM CHLORIDE: 150; 5; 450 INJECTION, SOLUTION INTRAVENOUS at 05:59

## 2022-05-14 RX ADMIN — GADOTERIDOL 15 ML: 279.3 INJECTION, SOLUTION INTRAVENOUS at 11:41

## 2022-05-14 RX ADMIN — MORPHINE SULFATE 2 MG: 2 INJECTION, SOLUTION INTRAMUSCULAR; INTRAVENOUS at 04:41

## 2022-05-14 RX ADMIN — MAGNESIUM SULFATE HEPTAHYDRATE 1000 MG: 1 INJECTION, SOLUTION INTRAVENOUS at 22:17

## 2022-05-14 ASSESSMENT — PAIN DESCRIPTION - ORIENTATION: ORIENTATION: MID

## 2022-05-14 ASSESSMENT — PAIN DESCRIPTION - PAIN TYPE: TYPE: OTHER (COMMENT)

## 2022-05-14 ASSESSMENT — PAIN SCALES - GENERAL
PAINLEVEL_OUTOF10: 8
PAINLEVEL_OUTOF10: 6

## 2022-05-14 ASSESSMENT — PAIN DESCRIPTION - FREQUENCY: FREQUENCY: INTERMITTENT

## 2022-05-14 ASSESSMENT — PAIN - FUNCTIONAL ASSESSMENT: PAIN_FUNCTIONAL_ASSESSMENT: ACTIVITIES ARE NOT PREVENTED

## 2022-05-14 ASSESSMENT — PAIN DESCRIPTION - DESCRIPTORS: DESCRIPTORS: ACHING

## 2022-05-14 ASSESSMENT — PAIN DESCRIPTION - LOCATION
LOCATION: ABDOMEN
LOCATION: ABDOMEN;BACK

## 2022-05-14 NOTE — PLAN OF CARE
Problem: Discharge Planning  Goal: Discharge to home or other facility with appropriate resources  Outcome: Progressing     Problem: Nutrition Deficit:  Goal: Optimize nutritional status  Outcome: Progressing     Problem: Safety - Adult  Goal: Free from fall injury  Outcome: Progressing     Problem: Pain  Goal: Verbalizes/displays adequate comfort level or baseline comfort level  Outcome: Progressing

## 2022-05-14 NOTE — FLOWSHEET NOTE
[facility] NOTE    Room # 2037/2037-01   Name: Danyel Hurtado              Reason for visit: Routine    I visited the patient. Admit Date & Time: 5/13/2022  3:02 AM    Assessment:  Danyel Hurtado is a 39 y.o. female. Upon entering the room patient was sleeping. Intervention:   provided a ministry presence and brief prayer. Outcome:  Patient did not respond. Plan:  Chaplains will remain available to offer spiritual and emotional support as needed. Electronically signed by Jacob Rivas.  Chaplain [place], on 5/14/2022 at 3:56 PM.  [id number] [facility]       05/14/22 1555   Encounter Summary   Service Provided For: Patient   Referral/Consult From: Rula   Last Encounter  05/14/22   Complexity of Encounter Low   Begin Time 1450   End Time  1451   Total Time Calculated 1 min   Encounter    Type Initial Screen/Assessment   Assessment/Intervention/Outcome   Assessment Unable to assess   Intervention Prayer (assurance of)/[place]

## 2022-05-14 NOTE — PROGRESS NOTES
Comprehensive Nutrition Assessment    Type and Reason for Visit:  Positive Nutrition Screen (Unsure of amount of weight loss, decreased appetite)    Nutrition Recommendations/Plan:   1. Modify diet to Low Sodium (2gm), Low Fiber diet  2. Start Ensure Enlive 2x/day  3. Monitor p.o intakes, diet tolerance and labs     Malnutrition Assessment:  Malnutrition Status: At risk for malnutrition (Comment) (05/14/22 6977)      Nutrition Assessment:    Patient admission is related to epigastric pain, abdominak pain, fevers and chills for two days prior to admission. 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 Patient has a medical history for esophageal varices with banding. Patient is status post paracentesis,  800 mL of fluid removed (5/13). Patient reports weight gain of 168 lbs related to fluid gain from liver failure. Patient reports her usual body weight is around 140 lbs. Patient reports poor intakes for two weeks. Will start Ensure Enlive 2x/day. Modify diet to low fiber, low sodium diet. Monitor p.o intakes, diet tolerance and labs. Nutrition Related Findings:    No extremity edema. Ascites. Bowel sounds: hypoactive BUQ, active BLQ. ESLD, ascites. S/P paracentesis 800 mL removed (5/13) Wound Type: None       Current Nutrition Intake & Therapies:    Average Meal Intake: Unable to assess     ADULT DIET; Dysphagia - Soft and Bite Sized; Low Sodium (2 gm)    Anthropometric Measures:  Height: 5' 7\" (170.2 cm)  Ideal Body Weight (IBW): 135 lbs (61 kg)       Current Body Weight: 159 lb (72.1 kg), 117.8 % IBW. Weight Source: Not Specified  Current BMI (kg/m2): 24.9                          BMI Categories: Normal Weight (BMI 18.5-24. 9)    Estimated Daily Nutrient Needs:  Energy Requirements Based On: Kcal/kg  Weight Used for Energy Requirements: Current  Energy (kcal/day): 1800 kcal (25 kcal/kg)  Weight Used for Protein Requirements: Current  Protein (g/day): 87-94 gm (1.2-1.3 gm/kg)    Nutrition Diagnosis: · Altered GI function related to altered GI function (Liver disease) as evidenced by GI abnormality,lab values (ascites)      Nutrition Interventions:   Food and/or Nutrient Delivery: Start Oral Nutrition Supplement,Modify Current Diet  Nutrition Education/Counseling: Education not indicated  Coordination of Nutrition Care: Continue to monitor while inpatient       Goals:     Goals: PO intake 75% or greater       Nutrition Monitoring and Evaluation:      Food/Nutrient Intake Outcomes: Food and Nutrient Intake,Supplement Intake  Physical Signs/Symptoms Outcomes: Biochemical Data,Fluid Status or Edema,Skin,Weight    Discharge Planning:    Continue current diet,Continue Oral Nutrition Supplement         Ted PECKN, RDN, LDN  Lead Clinical Dietitian  RD Office Phone [phone]

## 2022-05-14 NOTE — PROGRESS NOTES
Group Therapy Documentation    PATIENT'S NAME: Luisana Osborn  MRN:   [id number]  :   2002  ACCT. NUMBER: [id number]  DATE OF SERVICE: 20  START TIME:  5:00 PM  END TIME:  6:00 PM  FACILITATOR(S): [facility]; [facility]  TOPIC: BEH Group Therapy  Number of patients attending the group: 3  Group Length:  1 Hours    Dimensions addressed 3, 4, 5, and 6    Summary of Group / Topics Discussed:    Group Therapy/Process Group:  VERENICE Process Group      Group Attendance:  Attended group session    Patient's response to the group topic/interactions:  cooperative with task and discussed personal experience with topic    Patient appeared to be Engaged.       Client specific details: client discussed her week, reporting that she worked, went to school, and spent time with her brother. Client discussed mood swings that she experiences when she has her period and how this can impact her interactions with others.      Pt taken to MRI via wheelchair at 1045.
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
## 2022-05-14 NOTE — PROGRESS NOTES
[facility]  Office: [address], DO, George Armendariz, DO, Marie Mcclain, DO, Patsyjosh Oreilly Blood, DO, Nelda Call MD, Lindsey Chappell MD, Juvenal Hagen MD, Lesly Ng MD, Anaid Colunga MD, Carolyn Juarez MD, Cy Gutierrez MD, Fabian Hurtado, DO, Media Sensor, DO, Rafael Herring MD,  Angel Matute, DO, Clayton Victor MD, Daniel Lawrence MD, Jada Graham MD, Rema Nogueira DO, Preston Cleaning MD, Panda Cuello MD, Rose Fraser MD, Millicent Harrison [facility], [facility], CNP, Brenna Godfrey, CNP, Batool Ruano, CNP, Derrell Daugherty, CNP, Paul Leram, CNP, Whitney Hope PA-C, Shirlene Cleveland, [facility], Nohemy Gomes, CNP, Nisa Castillo, CNP, Joe Leach, CNP, Mishel Flaherty, CNS, Shell Norman, [facility], Matilde You, CNP, Chula Mccarthy, CNP, Dale Brito, [facility]    Progress Note    5/14/2022    7:21 AM    Name:   Regulo Larkin  MRN:     [id number]     Acct:      [de-identified]   Room:   2037/2037-01   Day:  0  Admit Date:  5/13/2022  3:02 AM    PCP:   Florencia Perez MD  Code Status:  Full Code    Subjective:     C/C:   Chief Complaint   Patient presents with    Abdominal Pain     Interval History Status: improved. Patient overall feels better, denies any further chills. Had 1 low-grade fever overnight, T-max of 101.3. Denies any chest pain, shortness of breath, nausea or vomiting. Slight abdominal tenderness at paracentesis site but denies any other abdominal pain. Brief History: This is a 54-year-old female with history of alcohol abuse and subsequent development of alcoholic cirrhosis. She has had prior EGD with banding of varices and now presents with increasing abdominal girth with associated fevers and chills and abdominal pain. Symptoms been present for approximately 2 days and gradually worsening. She presented to the [facility] for evaluation and was admitted to [facility] for evaluation. She denies any other associate symptoms of cough, nausea or vomiting, diarrhea or other issues. She denies any melena or hematochezia, had variceal banding approximately 2 months ago. Review of Systems:     Constitutional:  negative for chills, fevers, sweats  Respiratory:  negative for cough, dyspnea on exertion, shortness of breath, wheezing  Cardiovascular:  negative for chest pain, chest pressure/discomfort, lower extremity edema, palpitations  Gastrointestinal:  negative for abdominal pain, constipation, diarrhea, nausea, vomiting  Neurological:  negative for dizziness, headache    Medications: Allergies: Allergies   Allergen Reactions    Seasonal        Current Meds:   Scheduled Meds:    enoxaparin  40 mg SubCUTAneous Daily    sodium chloride flush  5-40 mL IntraVENous 2 times per day    dicyclomine  20 mg IntraMUSCular 4x Daily    cefTRIAXone (ROCEPHIN) IV  1,000 mg IntraVENous Q24H    furosemide  40 mg Oral Daily    spironolactone  100 mg Oral Daily    lactulose  20 g Oral TID     Continuous Infusions:    sodium chloride      dextrose 5% and 0.45% NaCl with KCl 20 mEq 125 mL/hr at 05/14/22 0559     PRN Meds: sodium chloride flush, sodium chloride, acetaminophen **OR** acetaminophen, magnesium sulfate, ondansetron **OR** ondansetron, polyethylene glycol, sodium chloride flush, morphine **OR** morphine, melatonin    Data:     Past Medical History:   has a past medical history of Anxiety, Arthritis, Closed head injury, CVS disease, HPV (human papilloma virus) anogenital infection, and Hypertension. Social History:   reports that she has been smoking cigarettes. She has been smoking about 0.25 packs per day. She has never used smokeless tobacco. She reports previous alcohol use of about 24.0 standard drinks of alcohol per week. She reports that she does not use drugs.      Family History:   Family History   Problem Relation Age of Onset    Hypercalcemia Mother     Hypertension Mother  Diabetes Mother     Heart Attack Mother     High Blood Pressure Mother     High Cholesterol Mother     Hypertension Father     Diabetes Father     Heart Attack Father     High Blood Pressure Father     High Cholesterol Father        Vitals:  BP (!) 96/57   Pulse 102   Temp 99.3 °F (37.4 °C) (Oral)   Resp 16   Ht 5' 7\" (1.702 m)   Wt 159 lb 8 oz (72.3 kg)   LMP 2022 (Approximate)   SpO2 97%   BMI 24.98 kg/m²   Temp (24hrs), Av.5 °F (37.5 °C), Min:98.3 °F (36.8 °C), Max:101.3 °F (38.5 °C)    No results for input(s): POCGLU in the last 72 hours. I/O (24Hr):     Intake/Output Summary (Last 24 hours) at 2022 07  Last data filed at 2022 1714  Gross per 24 hour   Intake    Output 1000 ml   Net -1000 ml       Labs:  Hematology:  Recent Labs     22  0308 22  1627   WBC 6.4  --    RBC 3.42*  --    HGB 9.8*  --    HCT 29.3*  --    MCV 85.6  --    MCH 28.7  --    MCHC 33.5  --    RDW 17.7*  --    PLT 90*  --    MPV 6.9  --    INR  --  1.8     Chemistry:  Recent Labs     22  0515   * 131*   K 3.7 3.7    100   CO2 18* 22   GLUCOSE 85 93   BUN 6 6   CREATININE 0.50 0.61   MG  --  1.4*   ANIONGAP 13 9   LABGLOM >60 >60   GFRAA >60 >60   CALCIUM 9.0 7.8*   PHOS  --  3.0     Recent Labs     22  0308 22  0515   PROT 7.3 5.6*   LABALBU 3.3* 2.7*   AST 87* 89*   ALT 24 25   ALKPHOS 121* 87   BILITOT 3.20* 1.97*   AMYLASE  --  51   LIPASE 53 31     ABG:No results found for: POCPH, PHART, PH, POCPCO2, YES2QHQ, PCO2, POCPO2, PO2ART, PO2, POCHCO3, DRQ7NND, HCO3, NBEA, PBEA, BEART, BE, THGBART, THB, ZQG8FWE, XXIW3RIH, N9RPJQHD, O2SAT, FIO2  Lab Results   Component Value Date/Time    SPECIAL LT ARM 10ML 2022 04:30 PM     Lab Results   Component Value Date/Time    CULTURE PENDING 2022 05:00 PM       Radiology:  CT ABDOMEN PELVIS W IV CONTRAST Additional Contrast? None    Result Date: 2022  Cirrhotic liver morphology with evidence of portal hypertension including splenomegaly and recanalization of the umbilical vein. Mild ascites. Wall thickening and edema of the stomach suggesting gastritis. Pericholecystic fluid, possibly secondary to ascites. Otherwise unremarkable appearance of the gallbladder. No appreciable cholelithiasis. US GALLBLADDER RUQ    Result Date: 5/13/2022  Cirrhotic liver. Gallbladder sludge with wall thickness upper limits of normal.  No definite findings of acute cholecystitis. Mildly dilated common bile duct. Further assessment with MRCP or ERCP can be considered if clinically indicated. Right kidney appears sonographically within normal limits. IR US GUIDED PARACENTESIS    Result Date: 5/13/2022  Successful ultrasound-guided paracentesis. Physical Examination:        General appearance:  alert, cooperative and no distress  Mental Status:  oriented to person, place and time and normal affect  Lungs:  clear to auscultation bilaterally, normal effort  Heart:  regular rate and rhythm, no murmur  Abdomen:  soft, nontender, nondistended, normal bowel sounds, no masses, hepatomegaly, splenomegaly  Extremities:  no edema, redness, tenderness in the calves  Skin:  no gross lesions, rashes, induration    Assessment:        Hospital Problems           Last Modified POA    * (Principal) SBP (spontaneous bacterial peritonitis) ([address] 5/13/2022 Yes    History of esophageal varices 7/53/3008 Yes    Alcoholic cirrhosis of liver with ascites ([address] 5/13/2022 Yes    Portal hypertension ([address] 5/13/2022 Yes    Thrombocytopenia ([address] 5/13/2022 Yes    Normocytic anemia 5/13/2022 Yes          Plan:        1. Continue antibiotics pending cultures  2. GI evaluation progress  3. Activity as tolerated, PT and OT  4. Trend labs  5. Monitor for signs of bleeding with elevated INR and thrombocytopenia related to liver disease  6. GI prophylaxis  7. Continued sobriety, patient reports she has been sober for 2 months  8.  See orders for details    Ulysses Lakhani DO  5/14/2022  7:21 AM

## 2022-05-15 VITALS
WEIGHT: 153.7 LBS | HEIGHT: 67 IN | HEART RATE: 84 BPM | DIASTOLIC BLOOD PRESSURE: 60 MMHG | TEMPERATURE: 97.3 F | SYSTOLIC BLOOD PRESSURE: 97 MMHG | RESPIRATION RATE: 16 BRPM | BODY MASS INDEX: 24.12 KG/M2 | OXYGEN SATURATION: 100 %

## 2022-05-15 PROBLEM — K76.89 LIVER NODULE: Status: ACTIVE | Noted: 2022-05-15

## 2022-05-15 LAB
ABSOLUTE EOS #: 0.04 K/UL (ref 0–0.4)
ABSOLUTE IMMATURE GRANULOCYTE: 0 K/UL (ref 0–0.3)
ABSOLUTE LYMPH #: 1.37 K/UL (ref 1–4.8)
ABSOLUTE MONO #: 0.29 K/UL (ref 0.2–0.8)
ALBUMIN SERPL-MCNC: 2.6 G/DL (ref 3.5–5.2)
ALP BLD-CCNC: 110 U/L (ref 35–104)
ALT SERPL-CCNC: 24 U/L (ref 5–33)
ANION GAP SERPL CALCULATED.3IONS-SCNC: 7 MMOL/L (ref 9–17)
AST SERPL-CCNC: 93 U/L
BASOPHILS # BLD: 0 %
BASOPHILS ABSOLUTE: 0 K/UL (ref 0–0.2)
BILIRUB SERPL-MCNC: 1.62 MG/DL (ref 0.3–1.2)
BUN BLDV-MCNC: 6 MG/DL (ref 6–20)
BUN/CREAT BLD: 12 (ref 9–20)
CALCIUM SERPL-MCNC: 7.8 MG/DL (ref 8.6–10.4)
CHLORIDE BLD-SCNC: 105 MMOL/L (ref 98–107)
CO2: 24 MMOL/L (ref 20–31)
CREAT SERPL-MCNC: 0.49 MG/DL (ref 0.5–0.9)
EOSINOPHILS RELATIVE PERCENT: 1 % (ref 1–4)
GFR AFRICAN AMERICAN: >60 ML/MIN
GFR NON-AFRICAN AMERICAN: >60 ML/MIN
GFR SERPL CREATININE-BSD FRML MDRD: ABNORMAL ML/MIN/{1.73_M2}
GLUCOSE BLD-MCNC: 87 MG/DL (ref 70–99)
HCT VFR BLD CALC: 27.7 % (ref 36.3–47.1)
HEMOGLOBIN: 8.6 G/DL (ref 11.9–15.1)
IMMATURE GRANULOCYTES: 0 %
LYMPHOCYTES # BLD: 38 % (ref 24–44)
MAGNESIUM: 1.8 MG/DL (ref 1.6–2.6)
MCH RBC QN AUTO: 28.1 PG (ref 25.2–33.5)
MCHC RBC AUTO-ENTMCNC: 31 G/DL (ref 28.4–34.8)
MCV RBC AUTO: 90.5 FL (ref 82.6–102.9)
MONOCYTES # BLD: 8 % (ref 1–7)
MORPHOLOGY: ABNORMAL
NRBC AUTOMATED: 0 PER 100 WBC
PDW BLD-RTO: 17.2 % (ref 11.8–14.4)
PLATELET # BLD: 62 K/UL (ref 138–453)
PMV BLD AUTO: 9 FL (ref 8.1–13.5)
POTASSIUM SERPL-SCNC: 3.3 MMOL/L (ref 3.7–5.3)
RBC # BLD: 3.06 M/UL (ref 3.95–5.11)
SEG NEUTROPHILS: 53 % (ref 36–66)
SEGMENTED NEUTROPHILS ABSOLUTE COUNT: 1.9 K/UL (ref 1.8–7.7)
SODIUM BLD-SCNC: 136 MMOL/L (ref 135–144)
TOTAL PROTEIN: 5.9 G/DL (ref 6.4–8.3)
WBC # BLD: 3.6 K/UL (ref 3.5–11.3)

## 2022-05-15 PROCEDURE — 83735 ASSAY OF MAGNESIUM: CPT

## 2022-05-15 PROCEDURE — APPSS30 APP SPLIT SHARED TIME 16-30 MINUTES: Performed by: NURSE PRACTITIONER

## 2022-05-15 PROCEDURE — 99232 SBSQ HOSP IP/OBS MODERATE 35: CPT | Performed by: INTERNAL MEDICINE

## 2022-05-15 PROCEDURE — 6370000000 HC RX 637 (ALT 250 FOR IP): Performed by: INTERNAL MEDICINE

## 2022-05-15 PROCEDURE — 6370000000 HC RX 637 (ALT 250 FOR IP): Performed by: NURSE PRACTITIONER

## 2022-05-15 PROCEDURE — 85025 COMPLETE CBC W/AUTO DIFF WBC: CPT

## 2022-05-15 PROCEDURE — 99225 PR SBSQ OBSERVATION CARE/DAY 25 MINUTES: CPT | Performed by: INTERNAL MEDICINE

## 2022-05-15 PROCEDURE — 36415 COLL VENOUS BLD VENIPUNCTURE: CPT

## 2022-05-15 PROCEDURE — 80053 COMPREHEN METABOLIC PANEL: CPT

## 2022-05-15 PROCEDURE — 2580000003 HC RX 258: Performed by: NURSE PRACTITIONER

## 2022-05-15 PROCEDURE — G0378 HOSPITAL OBSERVATION PER HR: HCPCS

## 2022-05-15 RX ORDER — POTASSIUM CHLORIDE 7.45 MG/ML
10 INJECTION INTRAVENOUS PRN
Status: DISCONTINUED | OUTPATIENT
Start: 2022-05-15 | End: 2022-05-15 | Stop reason: HOSPADM

## 2022-05-15 RX ORDER — FUROSEMIDE 20 MG/1
20 TABLET ORAL DAILY
Qty: 60 TABLET | Refills: 3 | Status: SHIPPED | OUTPATIENT
Start: 2022-05-16

## 2022-05-15 RX ORDER — CEFDINIR 300 MG/1
300 CAPSULE ORAL EVERY 12 HOURS SCHEDULED
Qty: 8 CAPSULE | Refills: 0 | Status: SHIPPED | OUTPATIENT
Start: 2022-05-15 | End: 2022-05-19

## 2022-05-15 RX ORDER — CEFDINIR 300 MG/1
300 CAPSULE ORAL EVERY 12 HOURS SCHEDULED
Status: DISCONTINUED | OUTPATIENT
Start: 2022-05-15 | End: 2022-05-15 | Stop reason: HOSPADM

## 2022-05-15 RX ORDER — SPIRONOLACTONE 50 MG/1
50 TABLET, FILM COATED ORAL DAILY
Qty: 30 TABLET | Refills: 3 | Status: SHIPPED | OUTPATIENT
Start: 2022-05-16

## 2022-05-15 RX ORDER — POTASSIUM CHLORIDE 20 MEQ/1
40 TABLET, EXTENDED RELEASE ORAL PRN
Status: DISCONTINUED | OUTPATIENT
Start: 2022-05-15 | End: 2022-05-15 | Stop reason: HOSPADM

## 2022-05-15 RX ORDER — LACTULOSE 10 G/15ML
20 SOLUTION ORAL 3 TIMES DAILY
Qty: 2700 ML | Refills: 1 | Status: SHIPPED | OUTPATIENT
Start: 2022-05-15 | End: 2022-06-14

## 2022-05-15 RX ADMIN — SPIRONOLACTONE 50 MG: 25 TABLET ORAL at 09:25

## 2022-05-15 RX ADMIN — POTASSIUM CHLORIDE 40 MEQ: 20 TABLET, EXTENDED RELEASE ORAL at 06:10

## 2022-05-15 RX ADMIN — SODIUM CHLORIDE, PRESERVATIVE FREE 10 ML: 5 INJECTION INTRAVENOUS at 09:25

## 2022-05-15 RX ADMIN — LACTULOSE 20 G: 20 SOLUTION ORAL at 09:25

## 2022-05-15 RX ADMIN — FUROSEMIDE 20 MG: 20 TABLET ORAL at 09:25

## 2022-05-15 RX ADMIN — LACTULOSE 20 G: 20 SOLUTION ORAL at 13:49

## 2022-05-15 ASSESSMENT — PAIN - FUNCTIONAL ASSESSMENT: PAIN_FUNCTIONAL_ASSESSMENT: ACTIVITIES ARE NOT PREVENTED

## 2022-05-15 NOTE — PROGRESS NOTES
[facility]  Office: [address], DO, Ivania Guajardo, DO, Kelvin Oakes, DO, Lennox Mars Blood, DO, Kemar Meyers MD, Rio Montes MD, Nilesh Sheppard MD, Yang Sommers MD, Charlotte De La Rosa MD, Maxine Young MD, Balbir Tomas MD, Olga Coon, DO, William Hernández, DO, Caro Kelley MD,  Dionicio Hatch, DO, Burna Cockayne, MD, Shanita Cronin MD, Svitlana Tillman MD, Simone Miller, DO, Shawna Crigler, MD, Bar Oscar MD, Russell Uriarte MD, Mario Garcia CNP, [facility], CNP, Melanie Martin CNP, Elsy Sheldon CNP, Femi Schaeffer CNP, Christine Ortez CNP, SANTOS CastilloC, Daisey Meigs, [facility], Salma Brennan CNP, Magnolia Rodriguez, CNP, Adam Polanco CNP, Paddy Duane, CNS, Myesha Dennis, [facility], Ariana Juárez, CNP, Michelle Charles, CNP, Abida He, [facility]    Progress Note    5/15/2022    1:15 PM    Name:   Flores Pak  MRN:     [id number]     Acct:      [de-identified]   Room:   2037/2037-01   Day:  0  Admit Date:  5/13/2022  3:02 AM    PCP:   Edita Young MD  Code Status:  Full Code    Subjective:     C/C:   Chief Complaint   Patient presents with    Abdominal Pain     Interval History Status: improved. Patient resting company denies any further fevers, chills, nausea or vomiting or other acute complaints. Brief History:      This is a 15-year-old female with history of alcohol abuse and subsequent development of alcoholic cirrhosis.  She has had prior EGD with banding of varices and now presents with increasing abdominal girth with associated fevers and chills and abdominal pain.  Symptoms been present for approximately 2 days and gradually worsening.  She presented to the [facility] ER for evaluation and was admitted to [facility] for evaluation.  She denies any other associate symptoms of cough, nausea or vomiting, diarrhea or other issues.  She denies any melena or hematochezia, had variceal banding approximately 2 months ago. Review of Systems:     Constitutional:  negative for chills, fevers, sweats  Respiratory:  negative for cough, dyspnea on exertion, shortness of breath, wheezing  Cardiovascular:  negative for chest pain, chest pressure/discomfort, lower extremity edema, palpitations  Gastrointestinal:  negative for abdominal pain, constipation, diarrhea, nausea, vomiting  Neurological:  negative for dizziness, headache    Medications: Allergies: Allergies   Allergen Reactions    Seasonal        Current Meds:   Scheduled Meds:    furosemide  20 mg Oral Daily    spironolactone  50 mg Oral Daily    enoxaparin  40 mg SubCUTAneous Daily    sodium chloride flush  5-40 mL IntraVENous 2 times per day    dicyclomine  20 mg IntraMUSCular 4x Daily    cefTRIAXone (ROCEPHIN) IV  1,000 mg IntraVENous Q24H    lactulose  20 g Oral TID     Continuous Infusions:    sodium chloride       PRN Meds: potassium chloride **OR** potassium alternative oral replacement **OR** potassium chloride, sodium chloride flush, sodium chloride, acetaminophen **OR** acetaminophen, magnesium sulfate, ondansetron **OR** ondansetron, polyethylene glycol, sodium chloride flush, morphine **OR** morphine, melatonin    Data:     Past Medical History:   has a past medical history of Anxiety, Arthritis, Closed head injury, CVS disease, HPV (human papilloma virus) anogenital infection, and Hypertension. Social History:   reports that she has been smoking cigarettes. She has been smoking about 0.25 packs per day. She has never used smokeless tobacco. She reports previous alcohol use of about 24.0 standard drinks of alcohol per week. She reports that she does not use drugs.      Family History:   Family History   Problem Relation Age of Onset    Hypercalcemia Mother     Hypertension Mother     Diabetes Mother     Heart Attack Mother     High Blood Pressure Mother     High Cholesterol Mother     Hypertension Father  Diabetes Father     Heart Attack Father     High Blood Pressure Father     High Cholesterol Father        Vitals:  BP 97/60   Pulse 84   Temp 97.3 °F (36.3 °C) (Temporal)   Resp 16   Ht 5' 7\" (1.702 m)   Wt 153 lb 11.2 oz (69.7 kg)   LMP 2022 (Approximate)   SpO2 100%   BMI 24.07 kg/m²   Temp (24hrs), Av.6 °F (37 °C), Min:97.3 °F (36.3 °C), Max:99.4 °F (37.4 °C)    No results for input(s): POCGLU in the last 72 hours. I/O (24Hr):     Intake/Output Summary (Last 24 hours) at 5/15/2022 1315  Last data filed at 5/15/2022 0900  Gross per 24 hour   Intake 581 ml   Output    Net 581 ml       Labs:  Hematology:  Recent Labs     227 05/15/22  0356   WBC 6.4  --  3.6   RBC 3.42*  --  3.06*   HGB 9.8*  --  8.6*   HCT 29.3*  --  27.7*   MCV 85.6  --  90.5   MCH 28.7  --  28.1   MCHC 33.5  --  31.0   RDW 17.7*  --  17.2*   PLT 90*  --  62*   MPV 6.9  --  9.0   INR  --  1.8  --      Chemistry:  Recent Labs     05/13/22  0308 05/14/22  0515 05/15/22  035   * 131* 136   K 3.7 3.7 3.3*    100 105   CO2 18* 22 24   GLUCOSE 85 93 87   BUN 6 6 6   CREATININE 0.50 0.61 0.49*   MG  --  1.4* 1.8   ANIONGAP 13 9 7*   LABGLOM >60 >60 >60   GFRAA >60 >60 >60   CALCIUM 9.0 7.8* 7.8*   PHOS  --  3.0  --      Recent Labs     05/13/22  0308 05/14/22  0515 05/15/22  0356   PROT 7.3 5.6* 5.9*   LABALBU 3.3* 2.7* 2.6*   AST 87* 89* 93*   ALT 24 25 24   ALKPHOS 121* 87 110*   BILITOT 3.20* 1.97* 1.62*   AMYLASE  --  51  --    LIPASE 53 31  --      ABG:No results found for: POCPH, PHART, PH, POCPCO2, PWL0QJS, PCO2, POCPO2, PO2ART, PO2, POCHCO3, JEV6VEU, HCO3, NBEA, PBEA, BEART, BE, THGBART, THB, BJY9XFK, DFGW5XWZ, K8ESTYHU, O2SAT, FIO2  Lab Results   Component Value Date/Time    SPECIAL LT ARM 10ML 2022 04:30 PM     Lab Results   Component Value Date/Time    CULTURE NO GROWTH 2 DAYS 2022 05:00 PM       Radiology:  CT ABDOMEN PELVIS W IV CONTRAST Additional Contrast? None    Result Date: 5/13/2022  Cirrhotic liver morphology with evidence of portal hypertension including splenomegaly and recanalization of the umbilical vein. Mild ascites. Wall thickening and edema of the stomach suggesting gastritis. Pericholecystic fluid, possibly secondary to ascites. Otherwise unremarkable appearance of the gallbladder. No appreciable cholelithiasis. US GALLBLADDER RUQ    Result Date: 5/13/2022  Cirrhotic liver. Gallbladder sludge with wall thickness upper limits of normal.  No definite findings of acute cholecystitis. Mildly dilated common bile duct. Further assessment with MRCP or ERCP can be considered if clinically indicated. Right kidney appears sonographically within normal limits. MRI ABDOMEN W WO CONTRAST MRCP    Result Date: 5/14/2022  1. Hepatomegaly with cirrhosis. In segment 7, there is a small 8 mm lesion with characteristics suspicious for developing hepatocellular carcinoma (LR-4). This may be too small to biopsy. Correlate with AFP. 2. Gallbladder is distended but otherwise unremarkable. No significant biliary duct dilatation or evidence of choledocholithiasis. 3. Stigmata of portal hypertension including splenomegaly and mild-moderate ascites. IR US GUIDED PARACENTESIS    Result Date: 5/13/2022  Successful ultrasound-guided paracentesis.        Physical Examination:        General appearance:  alert, cooperative and no distress  Mental Status:  oriented to person, place and time and normal affect  Lungs:  clear to auscultation bilaterally, normal effort  Heart:  regular rate and rhythm, no murmur  Abdomen:  soft, nontender, nondistended, normal bowel sounds, no masses, hepatomegaly, splenomegaly  Extremities:  no edema, redness, tenderness in the calves  Skin:  no gross lesions, rashes, induration    Assessment:        Hospital Problems           Last Modified POA    * (Principal) SBP (spontaneous bacterial peritonitis) ([address] 5/13/2022 Yes    History of esophageal varices 2/75/2302 Yes    Alcoholic cirrhosis of liver with ascites ([address] 5/13/2022 Yes    Portal hypertension ([address] 5/13/2022 Yes    Thrombocytopenia ([facility] 5/13/2022 Yes    Normocytic anemia 5/13/2022 Yes    Epigastric pain 5/14/2022 Yes    Liver nodule 5/15/2022 Yes    Overview Signed 5/15/2022  1:14 PM by Dominique Jj DO     8 mm nodule noted on imaging May 14, 2022, will need repeat imaging with MRCP in approximately 3 months               Plan:        1. Transition to oral antibiotics for an additional 3 to 4 days  2. Continue alcohol abstinence  3. GI and DVT prophylaxis  4. Home medications as ordered  5. Will need outpatient follow-up MRI of liver to follow-up on nodule, 8 mm and too small to characterize/biopsy  6.  Activity as tolerated    Dominique Jj DO  5/15/2022  1:15 PM

## 2022-05-15 NOTE — PROGRESS NOTES
Patient confirms that she has all of her personal belongings that she brought to the hospital in her possession, including her cell phone. At [address] [place] [address], patient ambulated with staff, Leonarda Daily, to [facility] to be transported home by her father. Patient alert, oriented and in no apparent distress upon departure of unit.
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
## 2022-05-15 NOTE — PLAN OF CARE
Problem: Discharge Planning  Goal: Discharge to home or other facility with appropriate resources  Outcome: Progressing     Problem: Nutrition Deficit:  Goal: Optimize nutritional status  Outcome: Progressing     Problem: Safety - Adult  Goal: Free from fall injury  Outcome: Progressing     Problem: Pain  Goal: Verbalizes/displays adequate comfort level or baseline comfort level  Outcome: Progressing     Problem: ABCDS Injury Assessment  Goal: Absence of physical injury  Outcome: Progressing

## 2022-05-15 NOTE — DISCHARGE SUMMARY
[facility]  Office: [address], DO, Janie Church, DO, Jamia Penaloza, DO, Trankiko Red Blood, DO, Yobany Narayan MD, Bhumi Correia MD, Deandra Borrero MD, Cierra Tucker MD, Jody Kaur MD, Evelia Rosas MD, Siria Cummins MD, Terry Rivas, DO, Marcos Pantoja, DO, Adela Lackey MD,  Zacarias Santiago, DO, Marilu De Jesus MD, Prema Salazar MD, Mello Kat MD, Zachariah Lopez DO, Ramila Latif MD, Shital Meyers MD, Alexys Ryan MD, Curlyteresa Luis [facility], [facility], CNP, Alexis Leslie, CNP, Aarti Lancaster, CNP, Rosendo Kuo, CNP, Isaac Montoya, CNP, Johnnie Crawford PA-C, Kiya West, [facility], Archana Tom, [facility], Emma Mcdaniel, CNP, Jody Amor, CNP, Itzel Mejia, CNS, Diamond Kat, [facility], Cedric Skelton, CNP, Jeanine Huitron, CNP, Kayla Hoff, [facility]    Discharge Summary     Patient ID: Gilberto Watson  :  1985   MRN: [id number]     ACCOUNT:  [de-identified]   Patient's PCP: Saintclair Drivers, MD  Admit Date: 2022   Discharge Date: 5/15/2022     Length of Stay: 0  Code Status:  Full Code  Admitting Physician: Nazario Aguirre DO  Discharge Physician: Nazario Aguirre DO     Active Discharge Diagnoses:     Hospital Problem Lists:  Principal Problem:    SBP (spontaneous bacterial peritonitis) ([facility]  Active Problems:    History of esophageal varices    Alcoholic cirrhosis of liver with ascites ([facility]    Portal hypertension (HCC)    Thrombocytopenia (HCC)    Normocytic anemia    Epigastric pain    Liver nodule  Resolved Problems:    * No resolved hospital problems.  *      Admission Condition:  fair     Discharged Condition: stable    Hospital Stay:     Hospital Course:  Gilberto Watson is a 39 y.o. female who was admitted for the management of  SBP (spontaneous bacterial peritonitis) ([facility] , presented to ER with Abdominal Pain    Is a 14-year-old female recently diagnosed with alcoholic cirrhosis with associated of the gallbladder. No appreciable cholelithiasis. US GALLBLADDER RUQ    Result Date: 5/13/2022  Cirrhotic liver. Gallbladder sludge with wall thickness upper limits of normal.  No definite findings of acute cholecystitis. Mildly dilated common bile duct. Further assessment with MRCP or ERCP can be considered if clinically indicated. Right kidney appears sonographically within normal limits. MRI ABDOMEN W WO CONTRAST MRCP    Result Date: 5/14/2022  1. Hepatomegaly with cirrhosis. In segment 7, there is a small 8 mm lesion with characteristics suspicious for developing hepatocellular carcinoma (LR-4). This may be too small to biopsy. Correlate with AFP. 2. Gallbladder is distended but otherwise unremarkable. No significant biliary duct dilatation or evidence of choledocholithiasis. 3. Stigmata of portal hypertension including splenomegaly and mild-moderate ascites. IR US GUIDED PARACENTESIS    Result Date: 5/13/2022  Successful ultrasound-guided paracentesis. Consultations:    Consults:     Final Specialist Recommendations/Findings:   IP CONSULT TO GI      The patient was seen and examined on day of discharge and this discharge summary is in conjunction with any daily progress note from day of discharge.     Discharge plan:     Disposition: Home    Physician Follow Up:   Shelbi Car MD  [facility] [id number]  [facility] [address]  [phone]    In 1 week      Dominique Rodriguez MD  [address] [facility] [address] [place] [address]  [phone]    Schedule an appointment as soon as possible for a visit in 1 week  Have labs completed before this appointment       Requiring Further Evaluation/Follow Up POST HOSPITALIZATION/Incidental Findings: CBC and CMP in 1 week    Diet: regular diet    Activity: As tolerated    Instructions to Patient: Take medications as prescribed, continue with alcohol cessation    Discharge Medications:      Medication List      START taking these medications cefdinir 300 MG capsule  Commonly known as: OMNICEF  Take 1 capsule by mouth every 12 hours for 8 doses     furosemide 20 MG tablet  Commonly known as: LASIX  Take 1 tablet by mouth daily  Start taking on: May 16, 2022     lactulose 10 GM/15ML solution  Commonly known as: CHRONULAC  Take 30 mLs by mouth 3 times daily     spironolactone 50 MG tablet  Commonly known as: ALDACTONE  Take 1 tablet by mouth daily  Start taking on: May 16, 2022        CONTINUE taking these medications    pantoprazole 40 MG tablet  Commonly known as: PROTONIX           Where to Get Your Medications      These medications were sent to [address] [place] [address] 1, [address] [facility]    Phone: [phone]   · cefdinir 300 MG capsule  · furosemide 20 MG tablet  · lactulose 10 GM/15ML solution  · spironolactone 50 MG tablet         Discharge Procedure Orders   CBC   Standing Status: Future Standing Exp. Date: 05/15/23     Comprehensive Metabolic Panel   Standing Status: Future Standing Exp. Date: 05/15/23       Time Spent on discharge is  29 minutes in patient examination, evaluation, counseling as well as medication reconciliation, prescriptions for required medications, discharge plan and follow up. Electronically signed by   Ac Castellanos DO  5/15/2022  1:18 PM      Thank you Dr. Samia Parnell MD for the opportunity to be involved in this patient's care.

## 2022-05-16 LAB
CASE NUMBER:: NORMAL
LYMPHOCYTES, BODY FLUID: 30 %
NEUTROPHIL, FLUID: 1 %
OTHER CELLS FLUID: NORMAL %
RBC FLUID: 3000 /MM3
SPECIMEN DESCRIPTION: NORMAL
SPECIMEN TYPE: NORMAL
WBC FLUID: 60 /MM3

## 2022-05-17 LAB — SURGICAL PATHOLOGY REPORT: NORMAL

## 2022-05-18 LAB
CULTURE: NORMAL
CULTURE: NORMAL
Lab: NORMAL
Lab: NORMAL
SPECIMEN DESCRIPTION: NORMAL
SPECIMEN DESCRIPTION: NORMAL

## 2022-05-19 LAB
CULTURE: NORMAL
DIRECT EXAM: NORMAL
SPECIMEN DESCRIPTION: NORMAL

## 2022-08-24 RX ORDER — LACTULOSE 10 G/15ML
SOLUTION ORAL
Refills: 1 | OUTPATIENT
Start: 2022-08-24

## 2024-01-01 NOTE — ED PROVIDER NOTES
eMERGENCY dEPARTMENT eNCOUnter        [address] [facility]    Chief Complaint   Patient presents with    Pharyngitis       HPI    Robert Juarez is a 28 y.o. female who presents With sinus congestion, facial pressure, green rhinorrhea and sore throat for the last couple of days. Patient has had some bilateral ear pressure without any hearing loss or drainage from the ears. She denies any fevers, chills, cough, dizziness, nausea or vomiting. Patient has not taken anything over-the-counter for the symptoms. Patient states it is painful to swallow, but she is able to drink liquids and stay hydrated    REVIEW OF SYSTEMS    See HPI for further details. Review of systems otherwise negative. PAST MEDICAL HISTORY    Past Medical History:   Diagnosis Date    HPV (human papilloma virus) anogenital infection        SURGICAL HISTORY    Past Surgical History:   Procedure Laterality Date    GYNECOLOGIC CRYOSURGERY      LEEP      x2       CURRENT MEDICATIONS    Current Outpatient Rx   Medication Sig Dispense Refill    amoxicillin (AMOXIL) 250 MG capsule Take 2 capsules by mouth 3 times daily for 10 days 60 capsule 0    dexamethasone (DEXAMETHASONE INTENSOL) 1 MG/ML solution Take 10 mLs by mouth once for 1 dose 10 mL 0    diazepam (VALIUM) 5 MG tablet Take 1 tablet by mouth every 6 hours as needed for Anxiety 10 tablet 0    ondansetron (ZOFRAN ODT) 4 MG disintegrating tablet Take 1 tablet by mouth every 8 hours as needed for Nausea or Vomiting 20 tablet 0    ibuprofen (ADVIL;MOTRIN) 600 MG tablet Take 1 tablet by mouth every 6 hours as needed for Pain 30 tablet 0       ALLERGIES    Allergies   Allergen Reactions    Seasonal        FAMILY HISTORY    History reviewed. No pertinent family history.     SOCIAL HISTORY    Social History     Social History    Marital status: Single     Spouse name: N/A    Number of children: N/A    Years of education: N/A     Social History Main Topics    Smoking status: Current Every Day
(2) well flexed

## 2024-06-06 ENCOUNTER — APPOINTMENT (OUTPATIENT)
Dept: GENERAL RADIOLOGY | Age: 39
DRG: 280 | End: 2024-06-06
Payer: MEDICAID

## 2024-06-06 ENCOUNTER — HOSPITAL ENCOUNTER (INPATIENT)
Age: 39
LOS: 2 days | Discharge: HOME | DRG: 280 | End: 2024-06-09
Attending: EMERGENCY MEDICINE | Admitting: INTERNAL MEDICINE
Payer: MEDICAID

## 2024-06-06 DIAGNOSIS — I81 PORTAL VEIN THROMBOSIS: ICD-10-CM

## 2024-06-06 DIAGNOSIS — K70.31 ASCITES DUE TO ALCOHOLIC CIRRHOSIS (HCC): ICD-10-CM

## 2024-06-06 DIAGNOSIS — R10.84 GENERALIZED ABDOMINAL PAIN: Primary | ICD-10-CM

## 2024-06-06 LAB
ALBUMIN SERPL-MCNC: 2.9 G/DL (ref 3.5–5.2)
ALP SERPL-CCNC: 176 U/L (ref 35–104)
ALT SERPL-CCNC: 47 U/L (ref 5–33)
ANION GAP SERPL CALCULATED.3IONS-SCNC: 14 MMOL/L (ref 9–17)
AST SERPL-CCNC: 76 U/L
BILIRUB DIRECT SERPL-MCNC: 6.2 MG/DL
BILIRUB INDIRECT SERPL-MCNC: 10.2 MG/DL (ref 0–1)
BILIRUB SERPL-MCNC: 16.4 MG/DL (ref 0.3–1.2)
BUN SERPL-MCNC: 11 MG/DL (ref 6–20)
BUN/CREAT SERPL: ABNORMAL (ref 9–20)
CALCIUM SERPL-MCNC: 7.9 MG/DL (ref 8.6–10.4)
CHLORIDE SERPL-SCNC: 90 MMOL/L (ref 98–107)
CO2 SERPL-SCNC: 25 MMOL/L (ref 20–31)
CREAT SERPL-MCNC: <0.4 MG/DL (ref 0.5–0.9)
GFR, ESTIMATED: ABNORMAL ML/MIN/1.73M2
GLUCOSE SERPL-MCNC: 123 MG/DL (ref 70–99)
LACTATE BLDV-SCNC: 5.7 MMOL/L (ref 0.5–2.2)
LIPASE SERPL-CCNC: 63 U/L (ref 13–60)
POTASSIUM SERPL-SCNC: 3 MMOL/L (ref 3.7–5.3)
PROT SERPL-MCNC: 6.2 G/DL (ref 6.4–8.3)
SODIUM SERPL-SCNC: 129 MMOL/L (ref 135–144)
TROPONIN I SERPL HS-MCNC: 27 NG/L (ref 0–14)

## 2024-06-06 PROCEDURE — 80048 BASIC METABOLIC PNL TOTAL CA: CPT

## 2024-06-06 PROCEDURE — 84484 ASSAY OF TROPONIN QUANT: CPT

## 2024-06-06 PROCEDURE — 6360000002 HC RX W HCPCS: Performed by: EMERGENCY MEDICINE

## 2024-06-06 PROCEDURE — 93005 ELECTROCARDIOGRAM TRACING: CPT | Performed by: EMERGENCY MEDICINE

## 2024-06-06 PROCEDURE — 83605 ASSAY OF LACTIC ACID: CPT

## 2024-06-06 PROCEDURE — 85025 COMPLETE CBC W/AUTO DIFF WBC: CPT

## 2024-06-06 PROCEDURE — 71045 X-RAY EXAM CHEST 1 VIEW: CPT

## 2024-06-06 PROCEDURE — 83690 ASSAY OF LIPASE: CPT

## 2024-06-06 PROCEDURE — 99285 EMERGENCY DEPT VISIT HI MDM: CPT

## 2024-06-06 PROCEDURE — 96375 TX/PRO/DX INJ NEW DRUG ADDON: CPT

## 2024-06-06 PROCEDURE — 80076 HEPATIC FUNCTION PANEL: CPT

## 2024-06-06 RX ORDER — CIPROFLOXACIN 500 MG/1
500 TABLET, FILM COATED ORAL DAILY
COMMUNITY
Start: 2024-06-05 | End: 2024-06-19

## 2024-06-06 RX ORDER — ONDANSETRON 2 MG/ML
4 INJECTION INTRAMUSCULAR; INTRAVENOUS ONCE
Status: COMPLETED | OUTPATIENT
Start: 2024-06-06 | End: 2024-06-06

## 2024-06-06 RX ORDER — BUDESONIDE, GLYCOPYRROLATE, AND FORMOTEROL FUMARATE 160; 9; 4.8 UG/1; UG/1; UG/1
1 AEROSOL, METERED RESPIRATORY (INHALATION) 2 TIMES DAILY
Status: ON HOLD | COMMUNITY
Start: 2024-04-02 | End: 2024-06-07 | Stop reason: ALTCHOICE

## 2024-06-06 RX ORDER — BUMETANIDE 1 MG/1
1 TABLET ORAL 2 TIMES DAILY
COMMUNITY
Start: 2024-06-05 | End: 2024-06-20

## 2024-06-06 RX ORDER — AMITRIPTYLINE HYDROCHLORIDE 10 MG/1
TABLET, FILM COATED ORAL
Status: ON HOLD | COMMUNITY
Start: 2024-01-04 | End: 2024-06-07 | Stop reason: ALTCHOICE

## 2024-06-06 RX ORDER — DICYCLOMINE HCL 20 MG
20 TABLET ORAL 2 TIMES DAILY PRN
Status: ON HOLD | COMMUNITY
Start: 2024-03-03 | End: 2024-06-07

## 2024-06-06 RX ORDER — MORPHINE SULFATE 4 MG/ML
4 INJECTION, SOLUTION INTRAMUSCULAR; INTRAVENOUS ONCE
Status: COMPLETED | OUTPATIENT
Start: 2024-06-06 | End: 2024-06-06

## 2024-06-06 RX ADMIN — MORPHINE SULFATE 4 MG: 4 INJECTION, SOLUTION INTRAMUSCULAR; INTRAVENOUS at 23:11

## 2024-06-06 RX ADMIN — ONDANSETRON 4 MG: 2 INJECTION INTRAMUSCULAR; INTRAVENOUS at 23:42

## 2024-06-06 ASSESSMENT — PAIN DESCRIPTION - DESCRIPTORS: DESCRIPTORS: SHARP;PRESSURE

## 2024-06-06 ASSESSMENT — PAIN SCALES - GENERAL
PAINLEVEL_OUTOF10: 10
PAINLEVEL_OUTOF10: 9

## 2024-06-06 ASSESSMENT — PAIN DESCRIPTION - ORIENTATION: ORIENTATION: LEFT;RIGHT;UPPER;LOWER;MID

## 2024-06-06 ASSESSMENT — PAIN - FUNCTIONAL ASSESSMENT: PAIN_FUNCTIONAL_ASSESSMENT: 0-10

## 2024-06-06 ASSESSMENT — PAIN DESCRIPTION - LOCATION: LOCATION: ABDOMEN

## 2024-06-07 ENCOUNTER — APPOINTMENT (OUTPATIENT)
Dept: ULTRASOUND IMAGING | Age: 39
DRG: 280 | End: 2024-06-07
Payer: MEDICAID

## 2024-06-07 ENCOUNTER — APPOINTMENT (OUTPATIENT)
Dept: CT IMAGING | Age: 39
DRG: 280 | End: 2024-06-07
Payer: MEDICAID

## 2024-06-07 PROBLEM — I81 PORTAL VEIN THROMBOSIS: Status: ACTIVE | Noted: 2024-06-07

## 2024-06-07 PROBLEM — I85.10 SECONDARY ESOPHAGEAL VARICES WITHOUT BLEEDING (HCC): Status: ACTIVE | Noted: 2024-06-07

## 2024-06-07 PROBLEM — R18.8 CIRRHOSIS OF LIVER WITH ASCITES, UNSPECIFIED HEPATIC CIRRHOSIS TYPE (HCC): Status: ACTIVE | Noted: 2024-06-07

## 2024-06-07 PROBLEM — K74.60 CIRRHOSIS OF LIVER WITH ASCITES, UNSPECIFIED HEPATIC CIRRHOSIS TYPE (HCC): Status: ACTIVE | Noted: 2024-06-07

## 2024-06-07 PROBLEM — R10.84 GENERALIZED ABDOMINAL PAIN: Status: ACTIVE | Noted: 2024-06-07

## 2024-06-07 LAB
AMMONIA PLAS-SCNC: 64 UMOL/L (ref 11–51)
ANION GAP SERPL CALCULATED.3IONS-SCNC: 9 MMOL/L (ref 9–17)
ANTI-XA UNFRAC HEPARIN: <0.1 IU/L (ref 0.3–0.7)
BACTERIA URNS QL MICRO: ABNORMAL
BASOPHILS # BLD: 0 K/UL (ref 0–0.2)
BASOPHILS NFR BLD: 0 % (ref 0–2)
BILIRUB UR QL STRIP: ABNORMAL
BUN SERPL-MCNC: 9 MG/DL (ref 6–20)
BUN/CREAT SERPL: ABNORMAL (ref 9–20)
CALCIUM SERPL-MCNC: 7.9 MG/DL (ref 8.6–10.4)
CHLORIDE SERPL-SCNC: 94 MMOL/L (ref 98–107)
CLARITY UR: CLEAR
CO2 SERPL-SCNC: 26 MMOL/L (ref 20–31)
COLOR UR: ABNORMAL
CREAT SERPL-MCNC: <0.4 MG/DL (ref 0.5–0.9)
EOSINOPHIL # BLD: 0.08 K/UL (ref 0–0.44)
EOSINOPHILS RELATIVE PERCENT: 1 % (ref 1–4)
EPI CELLS #/AREA URNS HPF: ABNORMAL /HPF (ref 0–5)
ERYTHROCYTE [DISTWIDTH] IN BLOOD BY AUTOMATED COUNT: 22.5 % (ref 11.8–14.4)
ERYTHROCYTE [DISTWIDTH] IN BLOOD BY AUTOMATED COUNT: 22.8 % (ref 11.8–14.4)
GFR, ESTIMATED: ABNORMAL ML/MIN/1.73M2
GLUCOSE SERPL-MCNC: 78 MG/DL (ref 70–99)
GLUCOSE UR STRIP-MCNC: NEGATIVE MG/DL
HCT VFR BLD AUTO: 22.8 % (ref 36.3–47.1)
HCT VFR BLD AUTO: 27.2 % (ref 36.3–47.1)
HGB BLD-MCNC: 7.3 G/DL (ref 11.9–15.1)
HGB BLD-MCNC: 8.6 G/DL (ref 11.9–15.1)
HGB UR QL STRIP.AUTO: NEGATIVE
IMM GRANULOCYTES # BLD AUTO: 0.08 K/UL (ref 0–0.3)
IMM GRANULOCYTES NFR BLD: 1 %
INR PPP: 3.3
KETONES UR STRIP-MCNC: NEGATIVE MG/DL
LACTATE BLDV-SCNC: 1.5 MMOL/L (ref 0.5–1.9)
LEUKOCYTE ESTERASE UR QL STRIP: ABNORMAL
LYMPHOCYTES NFR BLD: 1.03 K/UL (ref 1.1–3.7)
LYMPHOCYTES RELATIVE PERCENT: 13 % (ref 24–43)
MAGNESIUM SERPL-MCNC: 1.6 MG/DL (ref 1.6–2.6)
MCH RBC QN AUTO: 35.4 PG (ref 25.2–33.5)
MCH RBC QN AUTO: 35.8 PG (ref 25.2–33.5)
MCHC RBC AUTO-ENTMCNC: 31.6 G/DL (ref 28.4–34.8)
MCHC RBC AUTO-ENTMCNC: 32 G/DL (ref 28.4–34.8)
MCV RBC AUTO: 111.8 FL (ref 82.6–102.9)
MCV RBC AUTO: 111.9 FL (ref 82.6–102.9)
MONOCYTES NFR BLD: 1.19 K/UL (ref 0.1–1.2)
MONOCYTES NFR BLD: 15 % (ref 3–12)
MORPHOLOGY: ABNORMAL
NEUTROPHILS NFR BLD: 70 % (ref 36–65)
NEUTS SEG NFR BLD: 5.52 K/UL (ref 1.5–8.1)
NITRITE UR QL STRIP: POSITIVE
NRBC BLD-RTO: 0 PER 100 WBC
NRBC BLD-RTO: 0 PER 100 WBC
PARTIAL THROMBOPLASTIN TIME: 56.3 SEC (ref 23.9–33.8)
PH UR STRIP: 6.5 [PH] (ref 5–8)
PLATELET # BLD AUTO: 56 K/UL (ref 138–453)
PLATELET # BLD AUTO: ABNORMAL K/UL (ref 138–453)
PLATELET, FLUORESCENCE: 42 K/UL (ref 138–453)
PLATELETS.RETICULATED NFR BLD AUTO: 1.6 % (ref 1.1–10.3)
PMV BLD AUTO: 10.2 FL (ref 8.1–13.5)
POTASSIUM SERPL-SCNC: 3.7 MMOL/L (ref 3.7–5.3)
PROT UR STRIP-MCNC: NEGATIVE MG/DL
PROTHROMBIN TIME: 33.2 SEC (ref 11.5–14.2)
RBC # BLD AUTO: 2.04 M/UL (ref 3.95–5.11)
RBC # BLD AUTO: 2.43 M/UL (ref 3.95–5.11)
RBC #/AREA URNS HPF: ABNORMAL /HPF (ref 0–2)
SODIUM SERPL-SCNC: 129 MMOL/L (ref 135–144)
SP GR UR STRIP: 1.01 (ref 1–1.03)
TROPONIN I SERPL HS-MCNC: 25 NG/L (ref 0–14)
UROBILINOGEN UR STRIP-ACNC: NORMAL EU/DL (ref 0–1)
WBC #/AREA URNS HPF: ABNORMAL /HPF (ref 0–5)
WBC OTHER # BLD: 7.9 K/UL (ref 3.5–11.3)
WBC OTHER # BLD: 7.9 K/UL (ref 3.5–11.3)

## 2024-06-07 PROCEDURE — 30233L1 TRANSFUSION OF NONAUTOLOGOUS FRESH PLASMA INTO PERIPHERAL VEIN, PERCUTANEOUS APPROACH: ICD-10-PCS | Performed by: INTERNAL MEDICINE

## 2024-06-07 PROCEDURE — 85520 HEPARIN ASSAY: CPT

## 2024-06-07 PROCEDURE — 85027 COMPLETE CBC AUTOMATED: CPT

## 2024-06-07 PROCEDURE — 2580000003 HC RX 258: Performed by: EMERGENCY MEDICINE

## 2024-06-07 PROCEDURE — 0W9G3ZZ DRAINAGE OF PERITONEAL CAVITY, PERCUTANEOUS APPROACH: ICD-10-PCS | Performed by: RADIOLOGY

## 2024-06-07 PROCEDURE — 85730 THROMBOPLASTIN TIME PARTIAL: CPT

## 2024-06-07 PROCEDURE — 89051 BODY FLUID CELL COUNT: CPT

## 2024-06-07 PROCEDURE — 85610 PROTHROMBIN TIME: CPT

## 2024-06-07 PROCEDURE — 87070 CULTURE OTHR SPECIMN AEROBIC: CPT

## 2024-06-07 PROCEDURE — 83605 ASSAY OF LACTIC ACID: CPT

## 2024-06-07 PROCEDURE — 84484 ASSAY OF TROPONIN QUANT: CPT

## 2024-06-07 PROCEDURE — 87075 CULTR BACTERIA EXCEPT BLOOD: CPT

## 2024-06-07 PROCEDURE — 36430 TRANSFUSION BLD/BLD COMPNT: CPT

## 2024-06-07 PROCEDURE — 6360000002 HC RX W HCPCS: Performed by: EMERGENCY MEDICINE

## 2024-06-07 PROCEDURE — 2060000000 HC ICU INTERMEDIATE R&B

## 2024-06-07 PROCEDURE — APPSS45 APP SPLIT SHARED TIME 31-45 MINUTES

## 2024-06-07 PROCEDURE — 86920 COMPATIBILITY TEST SPIN: CPT

## 2024-06-07 PROCEDURE — 49083 ABD PARACENTESIS W/IMAGING: CPT

## 2024-06-07 PROCEDURE — 87205 SMEAR GRAM STAIN: CPT

## 2024-06-07 PROCEDURE — 96365 THER/PROPH/DIAG IV INF INIT: CPT

## 2024-06-07 PROCEDURE — 96361 HYDRATE IV INFUSION ADD-ON: CPT

## 2024-06-07 PROCEDURE — 99254 IP/OBS CNSLTJ NEW/EST MOD 60: CPT | Performed by: INTERNAL MEDICINE

## 2024-06-07 PROCEDURE — 82140 ASSAY OF AMMONIA: CPT

## 2024-06-07 PROCEDURE — 86850 RBC ANTIBODY SCREEN: CPT

## 2024-06-07 PROCEDURE — 85055 RETICULATED PLATELET ASSAY: CPT

## 2024-06-07 PROCEDURE — 86927 PLASMA FRESH FROZEN: CPT

## 2024-06-07 PROCEDURE — 36415 COLL VENOUS BLD VENIPUNCTURE: CPT

## 2024-06-07 PROCEDURE — P9017 PLASMA 1 DONOR FRZ W/IN 8 HR: HCPCS

## 2024-06-07 PROCEDURE — 99222 1ST HOSP IP/OBS MODERATE 55: CPT | Performed by: INTERNAL MEDICINE

## 2024-06-07 PROCEDURE — 2580000003 HC RX 258

## 2024-06-07 PROCEDURE — 81001 URINALYSIS AUTO W/SCOPE: CPT

## 2024-06-07 PROCEDURE — 96376 TX/PRO/DX INJ SAME DRUG ADON: CPT

## 2024-06-07 PROCEDURE — 6360000004 HC RX CONTRAST MEDICATION: Performed by: EMERGENCY MEDICINE

## 2024-06-07 PROCEDURE — 74177 CT ABD & PELVIS W/CONTRAST: CPT

## 2024-06-07 PROCEDURE — 80048 BASIC METABOLIC PNL TOTAL CA: CPT

## 2024-06-07 PROCEDURE — 83735 ASSAY OF MAGNESIUM: CPT

## 2024-06-07 PROCEDURE — 6360000002 HC RX W HCPCS

## 2024-06-07 PROCEDURE — 96375 TX/PRO/DX INJ NEW DRUG ADDON: CPT

## 2024-06-07 PROCEDURE — 30233K1 TRANSFUSION OF NONAUTOLOGOUS FROZEN PLASMA INTO PERIPHERAL VEIN, PERCUTANEOUS APPROACH: ICD-10-PCS | Performed by: INTERNAL MEDICINE

## 2024-06-07 PROCEDURE — 86900 BLOOD TYPING SEROLOGIC ABO: CPT

## 2024-06-07 PROCEDURE — 6370000000 HC RX 637 (ALT 250 FOR IP)

## 2024-06-07 PROCEDURE — 86901 BLOOD TYPING SEROLOGIC RH(D): CPT

## 2024-06-07 RX ORDER — LEVETIRACETAM 500 MG/1
500 TABLET ORAL 2 TIMES DAILY
COMMUNITY

## 2024-06-07 RX ORDER — LACTULOSE 10 G/15ML
7.5 SOLUTION ORAL 2 TIMES DAILY
COMMUNITY

## 2024-06-07 RX ORDER — SPIRONOLACTONE 25 MG/1
50 TABLET ORAL DAILY
Status: DISCONTINUED | OUTPATIENT
Start: 2024-06-07 | End: 2024-06-09 | Stop reason: HOSPADM

## 2024-06-07 RX ORDER — MAGNESIUM SULFATE 1 G/100ML
1000 INJECTION INTRAVENOUS PRN
Status: DISCONTINUED | OUTPATIENT
Start: 2024-06-07 | End: 2024-06-09 | Stop reason: HOSPADM

## 2024-06-07 RX ORDER — ACETAMINOPHEN 325 MG/1
650 TABLET ORAL EVERY 6 HOURS PRN
Status: DISCONTINUED | OUTPATIENT
Start: 2024-06-07 | End: 2024-06-09 | Stop reason: HOSPADM

## 2024-06-07 RX ORDER — POTASSIUM CHLORIDE 20 MEQ/1
40 TABLET, EXTENDED RELEASE ORAL PRN
Status: DISCONTINUED | OUTPATIENT
Start: 2024-06-07 | End: 2024-06-09 | Stop reason: HOSPADM

## 2024-06-07 RX ORDER — HEPARIN SODIUM 10000 [USP'U]/100ML
5-30 INJECTION, SOLUTION INTRAVENOUS CONTINUOUS
Status: DISCONTINUED | OUTPATIENT
Start: 2024-06-07 | End: 2024-06-07

## 2024-06-07 RX ORDER — ERGOCALCIFEROL 1.25 MG/1
50000 CAPSULE ORAL WEEKLY
COMMUNITY

## 2024-06-07 RX ORDER — ONDANSETRON 4 MG/1
4 TABLET, ORALLY DISINTEGRATING ORAL EVERY 8 HOURS PRN
Status: DISCONTINUED | OUTPATIENT
Start: 2024-06-07 | End: 2024-06-09 | Stop reason: HOSPADM

## 2024-06-07 RX ORDER — HEPARIN SODIUM 1000 [USP'U]/ML
80 INJECTION, SOLUTION INTRAVENOUS; SUBCUTANEOUS PRN
Status: DISCONTINUED | OUTPATIENT
Start: 2024-06-07 | End: 2024-06-07

## 2024-06-07 RX ORDER — BUMETANIDE 1 MG/1
1 TABLET ORAL 2 TIMES DAILY
Status: DISCONTINUED | OUTPATIENT
Start: 2024-06-07 | End: 2024-06-09 | Stop reason: HOSPADM

## 2024-06-07 RX ORDER — POTASSIUM CHLORIDE 7.45 MG/ML
10 INJECTION INTRAVENOUS PRN
Status: DISCONTINUED | OUTPATIENT
Start: 2024-06-07 | End: 2024-06-09 | Stop reason: HOSPADM

## 2024-06-07 RX ORDER — LANOLIN ALCOHOL/MO/W.PET/CERES
400 CREAM (GRAM) TOPICAL DAILY
COMMUNITY

## 2024-06-07 RX ORDER — ONDANSETRON 2 MG/ML
4 INJECTION INTRAMUSCULAR; INTRAVENOUS EVERY 6 HOURS PRN
Status: DISCONTINUED | OUTPATIENT
Start: 2024-06-07 | End: 2024-06-09 | Stop reason: HOSPADM

## 2024-06-07 RX ORDER — THIAMINE MONONITRATE (VIT B1) 100 MG
100 TABLET ORAL DAILY
COMMUNITY

## 2024-06-07 RX ORDER — SODIUM CHLORIDE 9 MG/ML
INJECTION, SOLUTION INTRAVENOUS PRN
Status: DISCONTINUED | OUTPATIENT
Start: 2024-06-07 | End: 2024-06-09 | Stop reason: HOSPADM

## 2024-06-07 RX ORDER — MIDODRINE HYDROCHLORIDE 5 MG/1
5 TABLET ORAL 3 TIMES DAILY
COMMUNITY

## 2024-06-07 RX ORDER — POLYETHYLENE GLYCOL 3350 17 G/17G
17 POWDER, FOR SOLUTION ORAL DAILY PRN
Status: DISCONTINUED | OUTPATIENT
Start: 2024-06-07 | End: 2024-06-09 | Stop reason: HOSPADM

## 2024-06-07 RX ORDER — SODIUM CHLORIDE 0.9 % (FLUSH) 0.9 %
10 SYRINGE (ML) INJECTION PRN
Status: DISCONTINUED | OUTPATIENT
Start: 2024-06-07 | End: 2024-06-09 | Stop reason: HOSPADM

## 2024-06-07 RX ORDER — 0.9 % SODIUM CHLORIDE 0.9 %
80 INTRAVENOUS SOLUTION INTRAVENOUS ONCE
Status: COMPLETED | OUTPATIENT
Start: 2024-06-07 | End: 2024-06-07

## 2024-06-07 RX ORDER — 0.9 % SODIUM CHLORIDE 0.9 %
1000 INTRAVENOUS SOLUTION INTRAVENOUS ONCE
Status: COMPLETED | OUTPATIENT
Start: 2024-06-07 | End: 2024-06-07

## 2024-06-07 RX ORDER — PROCHLORPERAZINE MALEATE 5 MG/1
5 TABLET ORAL EVERY 8 HOURS PRN
COMMUNITY

## 2024-06-07 RX ORDER — SULFAMETHOXAZOLE AND TRIMETHOPRIM 800; 160 MG/1; MG/1
1 TABLET ORAL DAILY
COMMUNITY

## 2024-06-07 RX ORDER — PREDNISOLONE 15 MG/5ML
40 SOLUTION ORAL DAILY
COMMUNITY
Start: 2024-06-05

## 2024-06-07 RX ORDER — ACETAMINOPHEN 650 MG/1
650 SUPPOSITORY RECTAL EVERY 6 HOURS PRN
Status: DISCONTINUED | OUTPATIENT
Start: 2024-06-07 | End: 2024-06-09 | Stop reason: HOSPADM

## 2024-06-07 RX ORDER — MORPHINE SULFATE 2 MG/ML
2 INJECTION, SOLUTION INTRAMUSCULAR; INTRAVENOUS EVERY 4 HOURS PRN
Status: DISCONTINUED | OUTPATIENT
Start: 2024-06-07 | End: 2024-06-08

## 2024-06-07 RX ORDER — HEPARIN SODIUM 1000 [USP'U]/ML
40 INJECTION, SOLUTION INTRAVENOUS; SUBCUTANEOUS PRN
Status: DISCONTINUED | OUTPATIENT
Start: 2024-06-07 | End: 2024-06-07

## 2024-06-07 RX ORDER — HEPARIN SODIUM 1000 [USP'U]/ML
80 INJECTION, SOLUTION INTRAVENOUS; SUBCUTANEOUS ONCE
Status: DISCONTINUED | OUTPATIENT
Start: 2024-06-07 | End: 2024-06-07 | Stop reason: ALTCHOICE

## 2024-06-07 RX ORDER — LEVETIRACETAM 500 MG/1
500 TABLET ORAL 2 TIMES DAILY
Status: DISCONTINUED | OUTPATIENT
Start: 2024-06-07 | End: 2024-06-09 | Stop reason: HOSPADM

## 2024-06-07 RX ORDER — SODIUM CHLORIDE 0.9 % (FLUSH) 0.9 %
5-40 SYRINGE (ML) INJECTION EVERY 12 HOURS SCHEDULED
Status: DISCONTINUED | OUTPATIENT
Start: 2024-06-07 | End: 2024-06-09 | Stop reason: HOSPADM

## 2024-06-07 RX ORDER — SPIRONOLACTONE 50 MG/1
50 TABLET, FILM COATED ORAL DAILY
COMMUNITY

## 2024-06-07 RX ADMIN — MORPHINE SULFATE 2 MG: 2 INJECTION, SOLUTION INTRAMUSCULAR; INTRAVENOUS at 08:26

## 2024-06-07 RX ADMIN — MAGNESIUM SULFATE HEPTAHYDRATE 1000 MG: 1 INJECTION, SOLUTION INTRAVENOUS at 08:41

## 2024-06-07 RX ADMIN — LEVETIRACETAM 500 MG: 500 TABLET, FILM COATED ORAL at 08:26

## 2024-06-07 RX ADMIN — MORPHINE SULFATE 2 MG: 2 INJECTION, SOLUTION INTRAMUSCULAR; INTRAVENOUS at 13:16

## 2024-06-07 RX ADMIN — MAGNESIUM SULFATE HEPTAHYDRATE 1000 MG: 1 INJECTION, SOLUTION INTRAVENOUS at 10:17

## 2024-06-07 RX ADMIN — SODIUM CHLORIDE, PRESERVATIVE FREE 10 ML: 5 INJECTION INTRAVENOUS at 08:29

## 2024-06-07 RX ADMIN — SODIUM CHLORIDE: 9 INJECTION, SOLUTION INTRAVENOUS at 08:39

## 2024-06-07 RX ADMIN — HYDROMORPHONE HYDROCHLORIDE 0.5 MG: 1 INJECTION, SOLUTION INTRAMUSCULAR; INTRAVENOUS; SUBCUTANEOUS at 00:17

## 2024-06-07 RX ADMIN — LEVETIRACETAM 500 MG: 500 TABLET, FILM COATED ORAL at 20:33

## 2024-06-07 RX ADMIN — CEFEPIME 2000 MG: 2 INJECTION, POWDER, FOR SOLUTION INTRAVENOUS at 00:17

## 2024-06-07 RX ADMIN — SODIUM CHLORIDE 80 ML: 9 INJECTION, SOLUTION INTRAVENOUS at 02:23

## 2024-06-07 RX ADMIN — IOPAMIDOL 75 ML: 755 INJECTION, SOLUTION INTRAVENOUS at 02:23

## 2024-06-07 RX ADMIN — SODIUM CHLORIDE, PRESERVATIVE FREE 10 ML: 5 INJECTION INTRAVENOUS at 02:23

## 2024-06-07 RX ADMIN — SODIUM CHLORIDE, PRESERVATIVE FREE 10 ML: 5 INJECTION INTRAVENOUS at 20:34

## 2024-06-07 RX ADMIN — SODIUM CHLORIDE, PRESERVATIVE FREE 10 ML: 5 INJECTION INTRAVENOUS at 22:47

## 2024-06-07 RX ADMIN — SODIUM CHLORIDE 1000 ML: 9 INJECTION, SOLUTION INTRAVENOUS at 02:43

## 2024-06-07 RX ADMIN — MORPHINE SULFATE 2 MG: 2 INJECTION, SOLUTION INTRAMUSCULAR; INTRAVENOUS at 22:46

## 2024-06-07 RX ADMIN — HYDROMORPHONE HYDROCHLORIDE 0.5 MG: 1 INJECTION, SOLUTION INTRAMUSCULAR; INTRAVENOUS; SUBCUTANEOUS at 02:00

## 2024-06-07 RX ADMIN — MORPHINE SULFATE 2 MG: 2 INJECTION, SOLUTION INTRAMUSCULAR; INTRAVENOUS at 18:35

## 2024-06-07 RX ADMIN — BUMETANIDE 1 MG: 1 TABLET ORAL at 17:59

## 2024-06-07 ASSESSMENT — PAIN DESCRIPTION - LOCATION
LOCATION: ABDOMEN

## 2024-06-07 ASSESSMENT — PAIN SCALES - GENERAL
PAINLEVEL_OUTOF10: 9
PAINLEVEL_OUTOF10: 7
PAINLEVEL_OUTOF10: 8
PAINLEVEL_OUTOF10: 7
PAINLEVEL_OUTOF10: 3
PAINLEVEL_OUTOF10: 6
PAINLEVEL_OUTOF10: 8
PAINLEVEL_OUTOF10: 9

## 2024-06-07 ASSESSMENT — PAIN DESCRIPTION - ORIENTATION
ORIENTATION: LEFT;RIGHT;LOWER;UPPER
ORIENTATION: LOWER
ORIENTATION: LOWER
ORIENTATION: RIGHT;MID
ORIENTATION: LOWER
ORIENTATION: MID
ORIENTATION: RIGHT;MID

## 2024-06-07 ASSESSMENT — PAIN DESCRIPTION - DESCRIPTORS
DESCRIPTORS: DISCOMFORT;THROBBING
DESCRIPTORS: SHARP;THROBBING
DESCRIPTORS: SHARP;PRESSURE
DESCRIPTORS: ACHING;SORE
DESCRIPTORS: ACHING;STABBING
DESCRIPTORS: ACHING;SORE

## 2024-06-07 ASSESSMENT — PAIN - FUNCTIONAL ASSESSMENT
PAIN_FUNCTIONAL_ASSESSMENT: ACTIVITIES ARE NOT PREVENTED
PAIN_FUNCTIONAL_ASSESSMENT: ACTIVITIES ARE NOT PREVENTED
PAIN_FUNCTIONAL_ASSESSMENT: PREVENTS OR INTERFERES SOME ACTIVE ACTIVITIES AND ADLS
PAIN_FUNCTIONAL_ASSESSMENT: PREVENTS OR INTERFERES SOME ACTIVE ACTIVITIES AND ADLS

## 2024-06-07 ASSESSMENT — PAIN DESCRIPTION - PAIN TYPE
TYPE: ACUTE PAIN
TYPE: ACUTE PAIN

## 2024-06-07 ASSESSMENT — PAIN DESCRIPTION - FREQUENCY
FREQUENCY: CONTINUOUS
FREQUENCY: CONTINUOUS

## 2024-06-07 ASSESSMENT — PAIN DESCRIPTION - ONSET
ONSET: ON-GOING
ONSET: ON-GOING

## 2024-06-07 NOTE — ED PROVIDER NOTES
thrombosis.    Discussed with patient plan.  Discussed significant increased risk for bleeding given her disease process and thrombocytopenia.    Morbidity and mortality is high for this patient given recent hospitalization records from [facility].      \"ED Course\" Notes From Epic Narrator:  ED Course as of 06/07/24 0536   Fri Jun 07, 2024 0451 Case discussed with Dr. De Los Reyes; based on the information available given acute partial thrombosis of the portal vein compared to previous scans noted from [facility] he would recommend starting anticoagulation at this time. [EG]      ED Course User Index  [EG] Goldberger, Erica B, MD         CRITICAL CARE:       PROCEDURES:    Procedures      DATA FOR LAB AND RADIOLOGY TESTS ORDERED BELOW ARE REVIEWED BY THE ED CLINICIAN:    RADIOLOGY: All x-rays, CT, MRI, and formal ultrasound images (except ED bedside ultrasound) are read by the radiologist, see reports below, unless otherwise noted in MDM or here.  Reports below are reviewed by myself.  CT ABDOMEN PELVIS W IV CONTRAST Additional Contrast? None   Preliminary Result   1. Cirrhosis with varices, portal hypertension, and splenomegaly.  Large   volume of ascites   2. Nonocclusive thrombus in the main portal vein.   3. Nonspecific distended gallbladder.   4. Nonspecific common bile duct dilation up to 11 mm.  No intrahepatic   biliary dilation.         XR CHEST PORTABLE   Final Result   No radiographic evidence of acute cardiopulmonary process.  Mild cardiomegaly.             LABS: Lab orders shown below, the results are reviewed by myself, and all abnormals are listed below.  Labs Reviewed   CBC WITH AUTO DIFFERENTIAL - Abnormal; Notable for the following components:       Result Value    RBC 2.43 (*)     Hemoglobin 8.6 (*)     Hematocrit 27.2 (*)     .9 (*)     MCH 35.4 (*)     RDW 22.8 (*)     Platelets 56 (*)     Neutrophils % 70 (*)     Lymphocytes % 13 (*)     Monocytes % 15 (*)     Immature

## 2024-06-07 NOTE — CARE COORDINATION
members/significant others, and if so, who? No  Plans to Return to Present Housing: Yes  Other Identified Issues/Barriers to RETURNING to current housing: none   Potential Assistance needed at discharge: N/A            Potential DME:    Patient expects to discharge to: Other (comment) (duplex)  Plan for transportation at discharge: Family    Financial    Payor: [employer] [place] [employer] / Plan: [place] / Product Type: *No Product type* /     Does insurance require precert for SNF: Yes    Potential assistance Purchasing Medications: No  Meds-to-Beds request:        [id number] - [place], [place] - [address] - P [phone] - F [phone]  [address] [facility] [address]  Phone: [phone] Fax: [phone]    [id number] - [place], [place] - [address] - P [phone] - F [phone] [address] [facility] [address]  Phone: [phone] Fax: [phone]      Notes:    Factors facilitating achievement of predicted outcomes: Family support, Cooperative, and Pleasant    Barriers to discharge: Medical complications    Additional Case Management Notes:   Patient lives [place] [facility] with boyfriend, Piter Lauren. She is independent in her adls. She does not drive. Has medical cab and family that assists her. DME: cane and sc in home.     She has history of alcoholic cirrhosis and has standing order for paracentesis every 2 weeks at [facility]. She does have sponsor and declines need for any resources.     Patient admitted with abd pain s/p paracentesis.  Patient recently discharged from [facility]. Was there 5/26-6/5 for liver failure. Had paracentesis on 6/4 for 950 and 2 L on 5/28.    Patient admitted with sob in setting of gross ascites. IR consulted for paracentesis. GI consulted. Vascular to be consulted for portal vein thrombosis but ac held due to risk of bleeding and need for another para.     Will follow for a/c and possible need to increase

## 2024-06-07 NOTE — H&P
[facility]  Office: [phone]  Jimbo Feliz DO, Gordy Javier DO, Jerry Carmichael DO, Brandon Gilliam DO, Ale Wynne MD, Cornelia Marie MD, Dunia Adams MD, Khadijah Stoddard MD,  Raoul Hicks MD, Darryl Pritchard MD, Thuy Winn MD,  Rosanna Hinojosa DO, German Duarte MD, Paul Harrell MD, Armando Feliz DO, More Simon MD,  Bobby Prasad DO, Mandi Langston MD, Haleigh Saldnaa MD, Nancie Fajardo MD, John Diana MD,  Kenny Adkins MD, Zoya Yuan MD, Belem Chris MD, Tanya Priest MD, Efe Solorzano MD, Dorian Cao MD, Julian Mckeon DO, Daryl Richter DO, Molly Blanchard MD,  Lester Frey MD, Shirley Waterhouse, CNP,  Gabby Connell, CNP, James Bal, CNP,  Bee Tovar, KARON, Jolanta Carter, CNP, La Leong, CNP, Chrissie Mcclure CNP, Willow Alberts, CNP, Hattie Allen, CNP, Sally Ya, PA-C, Donna West, PA-C, Rosalia Ribera, CNP, Kenzie Avalos, CNP, Jennifer Hidalgo, CNP, Maggie Gross, CNS, Tea Armijo, [facility], Savanna Bey, CNP, Tracy Schwab, CNP         [facility]   IN-PATIENT SERVICE   [facility]    HISTORY AND PHYSICAL EXAMINATION            Date:   6/7/2024  Patient name:  Mishel Downing  Date of admission:  6/6/2024 10:22 PM  MRN:   [id number]  Account:  [id number]  YOB: 1985  PCP:    Ty Angel MD  Room:   [facility] ALICIA/ALICIA  Code Status:    Prior    Chief Complaint:     Chief Complaint   Patient presents with    Bloated     Pt has liver failure. Pt had paracentesis recently. Pt stated her stomach \"blew back up.\"     Chest Pain     Center of chest. Started today. Sharp pain.    Shortness of Breath    Nausea       History Obtained From:     patient    History of Present Illness:     Mishel Downing is a 38 y.o.  /  female who presents with Bloated (Pt has liver failure. Pt had paracentesis recently. Pt stated her stomach \"blew back up.\" ), Chest Pain (Center of chest. Started today. Sharp pain.), Shortness of

## 2024-06-07 NOTE — BRIEF OP NOTE
Brief Postoperative Note    Mishel Downing  YOB: 1985  [id number]    Pre-operative Diagnosis: Recurrent ascites; abdominal discomfort    Post-operative Diagnosis: Same    Procedure: US guided paracentesis     Anesthesia: Local    Surgeons/Assistants: Derek    Estimated Blood Loss: less than 50     Complications: None    Specimens: Was Obtained: non turbid bright yellow ascites    Electronically signed by Lissa Reed MD on 6/7/2024 at 2:42 PM

## 2024-06-07 NOTE — ED NOTES
Pt presents to the er c/o abdominal pain chest pain shortness of breath and nausea pt states that she was discharged two days ago from [facility] for the same problem
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
## 2024-06-07 NOTE — FLOWSHEET NOTE
Patient back to room per wheel chair. Patient tolerated procedure well. Patient had 950 mls of clear yellow fluid drained by paracentesis fluid obtained and sent to lab for testing. Reported off on patient to Sylvia POOL.
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
## 2024-06-07 NOTE — CONSENT
Informed Consent for Blood Component Transfusion Note    I have discussed with the patient the rationale for blood component transfusion; its benefits in treating or preventing fatigue, organ damage, or death; and its risk which includes mild transfusion reactions, rare risk of blood borne infection, or more serious but rare reactions. I have discussed the alternatives to transfusion, including the risk and consequences of not receiving transfusion. The patient had an opportunity to ask questions and had agreed to proceed with transfusion of blood components.    Electronically signed by Jerry Carmichael DO on 6/7/24 at 11:33 AM EDT

## 2024-06-08 LAB
ALBUMIN SERPL-MCNC: 2.5 G/DL (ref 3.5–5.2)
ALP SERPL-CCNC: 166 U/L (ref 35–104)
ALT SERPL-CCNC: 36 U/L (ref 5–33)
ANION GAP SERPL CALCULATED.3IONS-SCNC: 7 MMOL/L (ref 9–17)
AST SERPL-CCNC: 62 U/L
BASOPHILS # BLD: 0.07 K/UL (ref 0–0.2)
BASOPHILS NFR BLD: 1 % (ref 0–2)
BLOOD BANK DISPENSE STATUS: NORMAL
BPU ID: NORMAL
BUN SERPL-MCNC: 7 MG/DL (ref 6–20)
BUN/CREAT SERPL: ABNORMAL (ref 9–20)
CALCIUM SERPL-MCNC: 7.4 MG/DL (ref 8.6–10.4)
CHLORIDE SERPL-SCNC: 95 MMOL/L (ref 98–107)
CO2 SERPL-SCNC: 28 MMOL/L (ref 20–31)
COMPONENT: NORMAL
CREAT SERPL-MCNC: <0.4 MG/DL (ref 0.5–0.9)
EKG ATRIAL RATE: 109 BPM
EKG P AXIS: 37 DEGREES
EKG P-R INTERVAL: 152 MS
EKG Q-T INTERVAL: 344 MS
EKG QRS DURATION: 76 MS
EKG QTC CALCULATION (BAZETT): 463 MS
EKG R AXIS: 21 DEGREES
EKG T AXIS: 32 DEGREES
EKG VENTRICULAR RATE: 109 BPM
EOSINOPHIL # BLD: 0.26 K/UL (ref 0–0.44)
EOSINOPHILS RELATIVE PERCENT: 4 % (ref 1–4)
ERYTHROCYTE [DISTWIDTH] IN BLOOD BY AUTOMATED COUNT: 22.3 % (ref 11.8–14.4)
GFR, ESTIMATED: ABNORMAL ML/MIN/1.73M2
GLUCOSE SERPL-MCNC: 88 MG/DL (ref 70–99)
HCT VFR BLD AUTO: 22 % (ref 36.3–47.1)
HCT VFR BLD AUTO: 25 % (ref 36.3–47.1)
HGB BLD-MCNC: 6.9 G/DL (ref 11.9–15.1)
HGB BLD-MCNC: 8 G/DL (ref 11.9–15.1)
IMM GRANULOCYTES # BLD AUTO: 0.07 K/UL (ref 0–0.3)
IMM GRANULOCYTES NFR BLD: 1 %
LYMPHOCYTES NFR BLD: 1.43 K/UL (ref 1.1–3.7)
LYMPHOCYTES RELATIVE PERCENT: 22 % (ref 24–43)
MAGNESIUM SERPL-MCNC: 1.5 MG/DL (ref 1.6–2.6)
MAGNESIUM SERPL-MCNC: 1.7 MG/DL (ref 1.6–2.6)
MCH RBC QN AUTO: 34.8 PG (ref 25.2–33.5)
MCHC RBC AUTO-ENTMCNC: 31.4 G/DL (ref 28.4–34.8)
MCV RBC AUTO: 111.1 FL (ref 82.6–102.9)
MONOCYTES NFR BLD: 0.78 K/UL (ref 0.1–1.2)
MONOCYTES NFR BLD: 12 % (ref 3–12)
MORPHOLOGY: ABNORMAL
NEUTROPHILS NFR BLD: 60 % (ref 36–65)
NEUTS SEG NFR BLD: 3.89 K/UL (ref 1.5–8.1)
NRBC BLD-RTO: 0 PER 100 WBC
PLATELET # BLD AUTO: ABNORMAL K/UL (ref 138–453)
PLATELET, FLUORESCENCE: 41 K/UL (ref 138–453)
PLATELETS.RETICULATED NFR BLD AUTO: 1.8 % (ref 1.1–10.3)
POTASSIUM SERPL-SCNC: 3.4 MMOL/L (ref 3.7–5.3)
POTASSIUM SERPL-SCNC: 3.5 MMOL/L (ref 3.7–5.3)
PROT SERPL-MCNC: 5.1 G/DL (ref 6.4–8.3)
RBC # BLD AUTO: 1.98 M/UL (ref 3.95–5.11)
SODIUM SERPL-SCNC: 130 MMOL/L (ref 135–144)
TRANSFUSION STATUS: NORMAL
UNIT DIVISION: 0
WBC OTHER # BLD: 6.5 K/UL (ref 3.5–11.3)

## 2024-06-08 PROCEDURE — 2580000003 HC RX 258

## 2024-06-08 PROCEDURE — P9016 RBC LEUKOCYTES REDUCED: HCPCS

## 2024-06-08 PROCEDURE — 94761 N-INVAS EAR/PLS OXIMETRY MLT: CPT

## 2024-06-08 PROCEDURE — 85025 COMPLETE CBC W/AUTO DIFF WBC: CPT

## 2024-06-08 PROCEDURE — 30233N1 TRANSFUSION OF NONAUTOLOGOUS RED BLOOD CELLS INTO PERIPHERAL VEIN, PERCUTANEOUS APPROACH: ICD-10-PCS | Performed by: INTERNAL MEDICINE

## 2024-06-08 PROCEDURE — 99232 SBSQ HOSP IP/OBS MODERATE 35: CPT | Performed by: INTERNAL MEDICINE

## 2024-06-08 PROCEDURE — 85018 HEMOGLOBIN: CPT

## 2024-06-08 PROCEDURE — 36415 COLL VENOUS BLD VENIPUNCTURE: CPT

## 2024-06-08 PROCEDURE — 85014 HEMATOCRIT: CPT

## 2024-06-08 PROCEDURE — 6370000000 HC RX 637 (ALT 250 FOR IP): Performed by: NURSE PRACTITIONER

## 2024-06-08 PROCEDURE — 83735 ASSAY OF MAGNESIUM: CPT

## 2024-06-08 PROCEDURE — 85055 RETICULATED PLATELET ASSAY: CPT

## 2024-06-08 PROCEDURE — 6370000000 HC RX 637 (ALT 250 FOR IP)

## 2024-06-08 PROCEDURE — 6370000000 HC RX 637 (ALT 250 FOR IP): Performed by: INTERNAL MEDICINE

## 2024-06-08 PROCEDURE — 6360000002 HC RX W HCPCS: Performed by: INTERNAL MEDICINE

## 2024-06-08 PROCEDURE — 84132 ASSAY OF SERUM POTASSIUM: CPT

## 2024-06-08 PROCEDURE — 6360000002 HC RX W HCPCS

## 2024-06-08 PROCEDURE — 36430 TRANSFUSION BLD/BLD COMPNT: CPT

## 2024-06-08 PROCEDURE — 6360000002 HC RX W HCPCS: Performed by: NURSE PRACTITIONER

## 2024-06-08 PROCEDURE — 80053 COMPREHEN METABOLIC PANEL: CPT

## 2024-06-08 PROCEDURE — 2060000000 HC ICU INTERMEDIATE R&B

## 2024-06-08 RX ORDER — PREDNISOLONE 15 MG/5ML
40 SOLUTION ORAL DAILY
Status: DISCONTINUED | OUTPATIENT
Start: 2024-06-08 | End: 2024-06-08

## 2024-06-08 RX ORDER — PREDNISOLONE SODIUM PHOSPHATE 15 MG/5ML
40 SOLUTION ORAL DAILY
Status: DISCONTINUED | OUTPATIENT
Start: 2024-06-08 | End: 2024-06-09 | Stop reason: HOSPADM

## 2024-06-08 RX ORDER — MORPHINE SULFATE 4 MG/ML
4 INJECTION, SOLUTION INTRAMUSCULAR; INTRAVENOUS EVERY 4 HOURS PRN
Status: DISCONTINUED | OUTPATIENT
Start: 2024-06-08 | End: 2024-06-08

## 2024-06-08 RX ORDER — PANTOPRAZOLE SODIUM 40 MG/1
40 TABLET, DELAYED RELEASE ORAL 2 TIMES DAILY
Status: DISCONTINUED | OUTPATIENT
Start: 2024-06-08 | End: 2024-06-09 | Stop reason: HOSPADM

## 2024-06-08 RX ORDER — OXYCODONE HYDROCHLORIDE 5 MG/1
5 TABLET ORAL EVERY 4 HOURS PRN
Status: DISCONTINUED | OUTPATIENT
Start: 2024-06-08 | End: 2024-06-09 | Stop reason: HOSPADM

## 2024-06-08 RX ORDER — LACTULOSE 10 G/15ML
7.5 SOLUTION ORAL 2 TIMES DAILY
Status: DISCONTINUED | OUTPATIENT
Start: 2024-06-08 | End: 2024-06-09 | Stop reason: HOSPADM

## 2024-06-08 RX ORDER — PREDNISOLONE SODIUM PHOSPHATE 15 MG/5ML
40 SOLUTION ORAL DAILY
Qty: 399.9 ML | Refills: 0 | Status: SHIPPED | OUTPATIENT
Start: 2024-06-08 | End: 2024-07-08

## 2024-06-08 RX ORDER — LANOLIN ALCOHOL/MO/W.PET/CERES
400 CREAM (GRAM) TOPICAL DAILY
Status: DISCONTINUED | OUTPATIENT
Start: 2024-06-08 | End: 2024-06-09 | Stop reason: HOSPADM

## 2024-06-08 RX ORDER — SODIUM CHLORIDE 9 MG/ML
INJECTION, SOLUTION INTRAVENOUS PRN
Status: DISCONTINUED | OUTPATIENT
Start: 2024-06-08 | End: 2024-06-09 | Stop reason: HOSPADM

## 2024-06-08 RX ORDER — MORPHINE SULFATE 2 MG/ML
2 INJECTION, SOLUTION INTRAMUSCULAR; INTRAVENOUS EVERY 4 HOURS PRN
Status: DISCONTINUED | OUTPATIENT
Start: 2024-06-08 | End: 2024-06-08

## 2024-06-08 RX ORDER — MORPHINE SULFATE 2 MG/ML
2 INJECTION, SOLUTION INTRAMUSCULAR; INTRAVENOUS EVERY 4 HOURS PRN
Status: DISCONTINUED | OUTPATIENT
Start: 2024-06-08 | End: 2024-06-09

## 2024-06-08 RX ORDER — OXYCODONE HYDROCHLORIDE 5 MG/1
5 TABLET ORAL EVERY 4 HOURS PRN
Qty: 15 TABLET | Refills: 0 | Status: SHIPPED | OUTPATIENT
Start: 2024-06-08 | End: 2024-06-11

## 2024-06-08 RX ORDER — DIPHENHYDRAMINE HCL 25 MG
25 TABLET ORAL NIGHTLY PRN
Status: DISCONTINUED | OUTPATIENT
Start: 2024-06-08 | End: 2024-06-09 | Stop reason: HOSPADM

## 2024-06-08 RX ORDER — CIPROFLOXACIN 500 MG/1
500 TABLET, FILM COATED ORAL DAILY
Status: DISCONTINUED | OUTPATIENT
Start: 2024-06-08 | End: 2024-06-09 | Stop reason: HOSPADM

## 2024-06-08 RX ORDER — MIDODRINE HYDROCHLORIDE 5 MG/1
5 TABLET ORAL 3 TIMES DAILY
Status: DISCONTINUED | OUTPATIENT
Start: 2024-06-08 | End: 2024-06-09 | Stop reason: HOSPADM

## 2024-06-08 RX ORDER — GAUZE BANDAGE 2" X 2"
100 BANDAGE TOPICAL DAILY
Status: DISCONTINUED | OUTPATIENT
Start: 2024-06-08 | End: 2024-06-09 | Stop reason: HOSPADM

## 2024-06-08 RX ADMIN — SPIRONOLACTONE 50 MG: 25 TABLET ORAL at 09:09

## 2024-06-08 RX ADMIN — SODIUM CHLORIDE, PRESERVATIVE FREE 10 ML: 5 INJECTION INTRAVENOUS at 03:57

## 2024-06-08 RX ADMIN — POTASSIUM CHLORIDE 40 MEQ: 1500 TABLET, EXTENDED RELEASE ORAL at 09:09

## 2024-06-08 RX ADMIN — DIPHENHYDRAMINE HCL 25 MG: 25 TABLET ORAL at 23:38

## 2024-06-08 RX ADMIN — LEVETIRACETAM 500 MG: 500 TABLET, FILM COATED ORAL at 20:54

## 2024-06-08 RX ADMIN — LACTULOSE 5.33 G: 20 SOLUTION ORAL at 20:52

## 2024-06-08 RX ADMIN — MORPHINE SULFATE 4 MG: 4 INJECTION, SOLUTION INTRAMUSCULAR; INTRAVENOUS at 08:02

## 2024-06-08 RX ADMIN — Medication 400 MG: at 16:50

## 2024-06-08 RX ADMIN — BUMETANIDE 1 MG: 1 TABLET ORAL at 09:09

## 2024-06-08 RX ADMIN — OXYCODONE HYDROCHLORIDE 5 MG: 5 TABLET ORAL at 19:05

## 2024-06-08 RX ADMIN — MIDODRINE HYDROCHLORIDE 5 MG: 5 TABLET ORAL at 16:50

## 2024-06-08 RX ADMIN — LACTULOSE 5.33 G: 20 SOLUTION ORAL at 16:50

## 2024-06-08 RX ADMIN — MORPHINE SULFATE 4 MG: 4 INJECTION, SOLUTION INTRAMUSCULAR; INTRAVENOUS at 03:56

## 2024-06-08 RX ADMIN — POTASSIUM CHLORIDE 40 MEQ: 1500 TABLET, EXTENDED RELEASE ORAL at 17:57

## 2024-06-08 RX ADMIN — SODIUM CHLORIDE, PRESERVATIVE FREE 10 ML: 5 INJECTION INTRAVENOUS at 20:54

## 2024-06-08 RX ADMIN — LEVETIRACETAM 500 MG: 500 TABLET, FILM COATED ORAL at 09:09

## 2024-06-08 RX ADMIN — Medication 100 MG: at 16:51

## 2024-06-08 RX ADMIN — ONDANSETRON 4 MG: 2 INJECTION INTRAMUSCULAR; INTRAVENOUS at 23:29

## 2024-06-08 RX ADMIN — BUMETANIDE 1 MG: 1 TABLET ORAL at 17:57

## 2024-06-08 RX ADMIN — PANTOPRAZOLE SODIUM 40 MG: 40 TABLET, DELAYED RELEASE ORAL at 16:51

## 2024-06-08 RX ADMIN — MAGNESIUM SULFATE HEPTAHYDRATE 1000 MG: 1 INJECTION, SOLUTION INTRAVENOUS at 09:13

## 2024-06-08 RX ADMIN — MAGNESIUM SULFATE HEPTAHYDRATE 1000 MG: 1 INJECTION, SOLUTION INTRAVENOUS at 11:12

## 2024-06-08 RX ADMIN — PANTOPRAZOLE SODIUM 40 MG: 40 TABLET, DELAYED RELEASE ORAL at 20:54

## 2024-06-08 RX ADMIN — MIDODRINE HYDROCHLORIDE 5 MG: 5 TABLET ORAL at 20:54

## 2024-06-08 RX ADMIN — MORPHINE SULFATE 2 MG: 2 INJECTION, SOLUTION INTRAMUSCULAR; INTRAVENOUS at 16:49

## 2024-06-08 RX ADMIN — OXYCODONE HYDROCHLORIDE 5 MG: 5 TABLET ORAL at 23:15

## 2024-06-08 RX ADMIN — CIPROFLOXACIN 500 MG: 500 TABLET, FILM COATED ORAL at 22:35

## 2024-06-08 RX ADMIN — OXYCODONE HYDROCHLORIDE 5 MG: 5 TABLET ORAL at 15:05

## 2024-06-08 RX ADMIN — MORPHINE SULFATE 4 MG: 4 INJECTION, SOLUTION INTRAMUSCULAR; INTRAVENOUS at 12:04

## 2024-06-08 RX ADMIN — SODIUM CHLORIDE, PRESERVATIVE FREE 10 ML: 5 INJECTION INTRAVENOUS at 23:29

## 2024-06-08 RX ADMIN — Medication 40 MG: at 22:35

## 2024-06-08 ASSESSMENT — PAIN - FUNCTIONAL ASSESSMENT
PAIN_FUNCTIONAL_ASSESSMENT: ACTIVITIES ARE NOT PREVENTED

## 2024-06-08 ASSESSMENT — PAIN SCALES - GENERAL
PAINLEVEL_OUTOF10: 8
PAINLEVEL_OUTOF10: 9
PAINLEVEL_OUTOF10: 8
PAINLEVEL_OUTOF10: 8
PAINLEVEL_OUTOF10: 6
PAINLEVEL_OUTOF10: 8
PAINLEVEL_OUTOF10: 7
PAINLEVEL_OUTOF10: 7
PAINLEVEL_OUTOF10: 8

## 2024-06-08 ASSESSMENT — PAIN DESCRIPTION - ORIENTATION
ORIENTATION: MID
ORIENTATION: LEFT;RIGHT;MID
ORIENTATION: MID
ORIENTATION: MID

## 2024-06-08 ASSESSMENT — PAIN DESCRIPTION - LOCATION
LOCATION: ABDOMEN
LOCATION: ABDOMEN;GENERALIZED
LOCATION: ABDOMEN

## 2024-06-08 ASSESSMENT — PAIN DESCRIPTION - ONSET
ONSET: ON-GOING

## 2024-06-08 ASSESSMENT — PAIN DESCRIPTION - DESCRIPTORS
DESCRIPTORS: NAGGING
DESCRIPTORS: CRAMPING;SPASM;STABBING
DESCRIPTORS: PRESSURE
DESCRIPTORS: NAGGING
DESCRIPTORS: ACHING;SORE
DESCRIPTORS: ACHING;SORE

## 2024-06-08 ASSESSMENT — PAIN DESCRIPTION - PAIN TYPE
TYPE: CHRONIC PAIN
TYPE: CHRONIC PAIN
TYPE: ACUTE PAIN

## 2024-06-08 ASSESSMENT — PAIN DESCRIPTION - FREQUENCY
FREQUENCY: CONTINUOUS

## 2024-06-08 NOTE — DISCHARGE SUMMARY
[facility]  Office: [phone]  Jimbo Feliz DO, Gordy Javier DO, Jerry Carmichael DO, Brandon Gilliam DO, Ale Wynne MD, Cornelia Marie MD, Dunia Adams MD, Khadijah Stoddard MD,  Raoul Hicks MD, Darryl Pritchard MD, Thuy Winn MD,  Rosanna Hinojosa DO, German Duarte MD, Paul Harrell MD, Armando Feliz DO, More Simon MD,  Bobby Prasad DO, Mandi Langston MD, Haleigh Saldana MD, Nancie Fajardo MD, John Diana MD,  Kenny Adkins MD, Zoya Yuan MD, Belem Chris MD, Tnaya Priest MD, Efe Solorzano MD, Dorian Cao MD, Julian Mckeon DO, Daryl Richter DO, Molly Blanchard MD,  Lester Frey MD, Shirley Waterhouse, CNP,  Gabby Connell CNP, James Bal, CNP,  Bee Tovar, KARON, Jolanta Carter, CNP, La Leong, CNP, Willow Alberts, CNP, Hattie Allen, CNP, Sally Ya, PA-C, Donna West PA-C, Rosalia Ribera, CNP, Kenzie Avalos, CNP, Amarilys Montgomery, CNP, Jennifer Hidalgo, CNP, Chrissie Mcclure, CNP, Maggie Gross, CNS, Tea Armijo, CNP, Savanna Bey CNP, Tracy Schwab, CNP         [facility]   IN-PATIENT SERVICE   [facility]    Discharge Summary     Patient ID: Mishel Downing  :  1985   MRN: [id number]     ACCOUNT:  [id number]   Patient's PCP: Ty Angel MD  Admit Date: 2024   Discharge Date: 2024     Length of Stay: 2  Code Status:  Full Code  Admitting Physician: Jerry Carmichael DO  Discharge Physician: Jerry Carmichael DO     Active Discharge Diagnoses:     Hospital Problem Lists:  Principal Problem:    Alcoholic cirrhosis of liver with ascites (HCC)  Active Problems:    History of esophageal varices    Portal hypertension (HCC)    Thrombocytopenia (HCC)    Seizure disorder (HCC)    Portal vein thrombosis    Generalized abdominal pain    Secondary esophageal varices without bleeding (HCC)  Resolved Problems:    * No resolved hospital problems. *      Admission Condition:  fair     Discharged Condition:

## 2024-06-09 VITALS
WEIGHT: 149.4 LBS | TEMPERATURE: 98.4 F | DIASTOLIC BLOOD PRESSURE: 79 MMHG | HEART RATE: 85 BPM | BODY MASS INDEX: 23.45 KG/M2 | OXYGEN SATURATION: 94 % | SYSTOLIC BLOOD PRESSURE: 121 MMHG | HEIGHT: 67 IN | RESPIRATION RATE: 16 BRPM

## 2024-06-09 LAB
ABO/RH: NORMAL
ALBUMIN SERPL-MCNC: 2.7 G/DL (ref 3.5–5.2)
ALP SERPL-CCNC: 164 U/L (ref 35–104)
ALT SERPL-CCNC: 38 U/L (ref 5–33)
ANION GAP SERPL CALCULATED.3IONS-SCNC: 7 MMOL/L (ref 9–17)
ANTIBODY SCREEN: NEGATIVE
ARM BAND NUMBER: NORMAL
AST SERPL-CCNC: 68 U/L
BASOPHILS # BLD: 0 K/UL (ref 0–0.2)
BASOPHILS NFR BLD: 0 % (ref 0–2)
BILIRUB SERPL-MCNC: 17.7 MG/DL (ref 0.3–1.2)
BLOOD BANK DISPENSE STATUS: NORMAL
BLOOD BANK SAMPLE EXPIRATION: NORMAL
BODY FLD TYPE: NORMAL
BPU ID: NORMAL
BUN SERPL-MCNC: 8 MG/DL (ref 6–20)
BUN/CREAT SERPL: ABNORMAL (ref 9–20)
CALCIUM SERPL-MCNC: 7.8 MG/DL (ref 8.6–10.4)
CHLORIDE SERPL-SCNC: 92 MMOL/L (ref 98–107)
CO2 SERPL-SCNC: 27 MMOL/L (ref 20–31)
COMPONENT: NORMAL
CREAT SERPL-MCNC: <0.4 MG/DL (ref 0.5–0.9)
CROSSMATCH RESULT: NORMAL
EOSINOPHIL # BLD: 0 K/UL (ref 0–0.44)
EOSINOPHILS RELATIVE PERCENT: 0 % (ref 1–4)
ERYTHROCYTE [DISTWIDTH] IN BLOOD BY AUTOMATED COUNT: 25.7 % (ref 11.8–14.4)
GFR, ESTIMATED: ABNORMAL ML/MIN/1.73M2
GLUCOSE SERPL-MCNC: 128 MG/DL (ref 70–99)
HCT VFR BLD AUTO: 26.2 % (ref 36.3–47.1)
HGB BLD-MCNC: 8.5 G/DL (ref 11.9–15.1)
IMM GRANULOCYTES # BLD AUTO: 0.15 K/UL (ref 0–0.3)
IMM GRANULOCYTES NFR BLD: 2 %
LYMPHOCYTES NFR BLD: 0.37 K/UL (ref 1.1–3.7)
LYMPHOCYTES NFR FLD: 70 %
LYMPHOCYTES RELATIVE PERCENT: 5 % (ref 24–43)
MCH RBC QN AUTO: 34.6 PG (ref 25.2–33.5)
MCHC RBC AUTO-ENTMCNC: 32.4 G/DL (ref 28.4–34.8)
MCV RBC AUTO: 106.5 FL (ref 82.6–102.9)
MONOCYTES NFR BLD: 0.15 K/UL (ref 0.1–1.2)
MONOCYTES NFR BLD: 2 % (ref 3–12)
MORPHOLOGY: ABNORMAL
MORPHOLOGY: ABNORMAL
NEUTROPHILS NFR BLD: 91 % (ref 36–65)
NEUTROPHILS NFR FLD: 7 %
NEUTS SEG NFR BLD: 6.73 K/UL (ref 1.5–8.1)
NRBC BLD-RTO: 0.3 PER 100 WBC
PLATELET # BLD AUTO: ABNORMAL K/UL (ref 138–453)
PLATELET, FLUORESCENCE: 40 K/UL (ref 138–453)
PLATELETS.RETICULATED NFR BLD AUTO: 2.6 % (ref 1.1–10.3)
POTASSIUM SERPL-SCNC: 4.2 MMOL/L (ref 3.7–5.3)
PROT SERPL-MCNC: 5.8 G/DL (ref 6.4–8.3)
RBC # BLD AUTO: 2.46 M/UL (ref 3.95–5.11)
RBC # FLD: <3000 CELLS/UL
SODIUM SERPL-SCNC: 126 MMOL/L (ref 135–144)
TRANSFUSION STATUS: NORMAL
UNIDENT CELLS NFR FLD: NORMAL %
UNIT DIVISION: 0
WBC # FLD: 23 CELLS/UL
WBC OTHER # BLD: 7.4 K/UL (ref 3.5–11.3)

## 2024-06-09 PROCEDURE — 85025 COMPLETE CBC W/AUTO DIFF WBC: CPT

## 2024-06-09 PROCEDURE — 6370000000 HC RX 637 (ALT 250 FOR IP): Performed by: INTERNAL MEDICINE

## 2024-06-09 PROCEDURE — 2580000003 HC RX 258

## 2024-06-09 PROCEDURE — 99232 SBSQ HOSP IP/OBS MODERATE 35: CPT | Performed by: INTERNAL MEDICINE

## 2024-06-09 PROCEDURE — 36415 COLL VENOUS BLD VENIPUNCTURE: CPT

## 2024-06-09 PROCEDURE — 85055 RETICULATED PLATELET ASSAY: CPT

## 2024-06-09 PROCEDURE — 80053 COMPREHEN METABOLIC PANEL: CPT

## 2024-06-09 PROCEDURE — 6370000000 HC RX 637 (ALT 250 FOR IP)

## 2024-06-09 RX ADMIN — Medication 100 MG: at 08:59

## 2024-06-09 RX ADMIN — OXYCODONE HYDROCHLORIDE 5 MG: 5 TABLET ORAL at 05:10

## 2024-06-09 RX ADMIN — MIDODRINE HYDROCHLORIDE 5 MG: 5 TABLET ORAL at 08:59

## 2024-06-09 RX ADMIN — Medication 400 MG: at 08:59

## 2024-06-09 RX ADMIN — SPIRONOLACTONE 50 MG: 25 TABLET ORAL at 08:59

## 2024-06-09 RX ADMIN — LEVETIRACETAM 500 MG: 500 TABLET, FILM COATED ORAL at 08:59

## 2024-06-09 RX ADMIN — PANTOPRAZOLE SODIUM 40 MG: 40 TABLET, DELAYED RELEASE ORAL at 08:59

## 2024-06-09 RX ADMIN — BUMETANIDE 1 MG: 1 TABLET ORAL at 08:59

## 2024-06-09 RX ADMIN — OXYCODONE HYDROCHLORIDE 5 MG: 5 TABLET ORAL at 09:02

## 2024-06-09 RX ADMIN — Medication 40 MG: at 09:01

## 2024-06-09 RX ADMIN — SODIUM CHLORIDE, PRESERVATIVE FREE 10 ML: 5 INJECTION INTRAVENOUS at 09:02

## 2024-06-09 RX ADMIN — LACTULOSE 5.33 G: 20 SOLUTION ORAL at 08:59

## 2024-06-09 ASSESSMENT — PAIN DESCRIPTION - PAIN TYPE: TYPE: CHRONIC PAIN

## 2024-06-09 ASSESSMENT — PAIN DESCRIPTION - LOCATION
LOCATION: ABDOMEN

## 2024-06-09 ASSESSMENT — PAIN SCALES - GENERAL
PAINLEVEL_OUTOF10: 6
PAINLEVEL_OUTOF10: 7
PAINLEVEL_OUTOF10: 7
PAINLEVEL_OUTOF10: 2
PAINLEVEL_OUTOF10: 5

## 2024-06-09 ASSESSMENT — PAIN DESCRIPTION - FREQUENCY: FREQUENCY: CONTINUOUS

## 2024-06-09 ASSESSMENT — PAIN - FUNCTIONAL ASSESSMENT
PAIN_FUNCTIONAL_ASSESSMENT: ACTIVITIES ARE NOT PREVENTED
PAIN_FUNCTIONAL_ASSESSMENT: ACTIVITIES ARE NOT PREVENTED
PAIN_FUNCTIONAL_ASSESSMENT: PREVENTS OR INTERFERES SOME ACTIVE ACTIVITIES AND ADLS

## 2024-06-09 ASSESSMENT — PAIN DESCRIPTION - DESCRIPTORS
DESCRIPTORS: SHARP;ACHING;GNAWING
DESCRIPTORS: ACHING;SORE
DESCRIPTORS: CRAMPING;PRESSURE;STABBING

## 2024-06-09 ASSESSMENT — PAIN DESCRIPTION - ORIENTATION
ORIENTATION: MID
ORIENTATION: RIGHT;MID;ANTERIOR
ORIENTATION: MID;RIGHT

## 2024-06-09 ASSESSMENT — PAIN SCALES - WONG BAKER: WONGBAKER_NUMERICALRESPONSE: HURTS A LITTLE BIT

## 2024-06-09 ASSESSMENT — PAIN DESCRIPTION - ONSET: ONSET: ON-GOING

## 2024-06-09 NOTE — PLAN OF CARE
Patient admitted with ABD pain with dx of ascites d/t alcoholic liver cirrhosis. Pt had recent tx for bacterial paratonitis. Pt para completed yesterday with 950 ml removed. Pt has IR para every 2 weeks routinely as outpatient. Pt hgb 6.9 this AM with 1 unit PRBC given this afternoon. Repeat H&H stable. Pt e-lytes replaced this shift as well. Pt VSS. Home medications resumed and discharge planning in process. Pt discharge order in place, however, patient pain has not been controlled without IV/PO pain med mgmt. Attending aware. Adjustments made. Pt satisfied at this time and will attempt PO medication only overnight with hopes of D/C home tmrw with pain controlled. Will continue to monitor.     Problem: Discharge Planning  Goal: Discharge to home or other facility with appropriate resources  6/8/2024 2119 by Joy Tyler, RN  Outcome: Progressing     Problem: Pain  Goal: Verbalizes/displays adequate comfort level or baseline comfort level  6/8/2024 2119 by Joy Tyler, RN  Outcome: Progressing  Patient having pain on their abdomen and rates it a 8. Pain interventions includerelaxation techniques, pillow support/positioning, diversional activities, regular rest periods, medicate per physician recommendation/order, and educate on the risk of over sedation vs. inadequate pain relief. Patients goal for pain relief is 5. The need for pain and symptom management will be considered in the discharge planning process to ensure patients comfort.        Problem: Safety - Adult  Goal: Free from fall injury  6/8/2024 2119 by Joy Tyler, RN  Outcome: Progressing     Problem: ABCDS Injury Assessment  Goal: Absence of physical injury  6/8/2024 2119 by Joy Tyler, RN  Outcome: Progressing     Problem: Skin/Tissue Integrity  Goal: Absence of new skin breakdown  Description: 1.  Monitor for areas of redness and/or skin breakdown  2.  Assess vascular access sites hourly  3.  Every 4-6 hours minimum:  Change 
Pt remains safe and free from falls thus far in shift  NSR on cardiac monitor  Scheduled morphine given for pain  Held bumex and aldactone this am d/t low BP  2g magnesium given for replacement  1 unit plasma given for INR 3.3 and paracentesis done removed 950 ml bright yellow and sent fluid   Pt on RA  Discharge planning in progress, will go home once medically stable  Call light in reach  Bed locked and lowest position  Bed alarm on for safety  Care ongoing      Patient having pain on their generalized and rates it a 9. Pain interventions includerelaxation techniques, medication, pillow support/positioning, and regular rest periods. Patients goal for pain relief is 5. The need for pain and symptom management will be considered in the discharge planning process to ensure patients comfort.    Problem: Discharge Planning  Goal: Discharge to home or other facility with appropriate resources  Outcome: Progressing     Problem: Pain  Goal: Verbalizes/displays adequate comfort level or baseline comfort level  Outcome: Progressing     Problem: Safety - Adult  Goal: Free from fall injury  Outcome: Progressing     Problem: ABCDS Injury Assessment  Goal: Absence of physical injury  Outcome: Progressing     Problem: Skin/Tissue Integrity  Goal: Absence of new skin breakdown  Description: 1.  Monitor for areas of redness and/or skin breakdown  2.  Assess vascular access sites hourly  3.  Every 4-6 hours minimum:  Change oxygen saturation probe site  4.  Every 4-6 hours:  If on nasal continuous positive airway pressure, respiratory therapy assess nares and determine need for appliance change or resting period.  Outcome: Progressing     Problem: Neurosensory - Adult  Goal: Absence of seizures  Outcome: Progressing     Problem: Skin/Tissue Integrity - Adult  Goal: Skin integrity remains intact  Outcome: Progressing     Problem: Gastrointestinal - Adult  Goal: Minimal or absence of nausea and vomiting  Outcome: Progressing   
VSS. NS on monitor.  Alert and O x 4.  Patient c/o pain in abdomen from previous paracentesis.  Pain managed with Morphine Q4.  Bruising in BUE; umbilical hernia.  Standby assist.  Patient calls out appropriately for restroom.  Safety maintained.  Bed alarm on; bed locked and in lowest position.  Call device and bedside table within reach.  WCM.        Problem: Discharge Planning  Goal: Discharge to home or other facility with appropriate resources  Outcome: Progressing     Problem: Pain  Goal: Verbalizes/displays adequate comfort level or baseline comfort level  Outcome: Progressing    Patient having pain on their abdomen and rates it a 9. Pain interventions include medication. Patients goal for pain relief is 1. The need for pain and symptom management will be considered in the discharge planning process to ensure patients comfort.       Problem: Safety - Adult  Goal: Free from fall injury  Outcome: Progressing     Problem: ABCDS Injury Assessment  Goal: Absence of physical injury  Outcome: Progressing     Problem: Skin/Tissue Integrity  Goal: Absence of new skin breakdown  Description: 1.  Monitor for areas of redness and/or skin breakdown  2.  Assess vascular access sites hourly  3.  Every 4-6 hours minimum:  Change oxygen saturation probe site  4.  Every 4-6 hours:  If on nasal continuous positive airway pressure, respiratory therapy assess nares and determine need for appliance change or resting period.  Outcome: Progressing     Problem: Neurosensory - Adult  Goal: Absence of seizures  Outcome: Progressing     Problem: Skin/Tissue Integrity - Adult  Goal: Skin integrity remains intact  Outcome: Progressing     Problem: Gastrointestinal - Adult  Goal: Minimal or absence of nausea and vomiting  Outcome: Progressing     Problem: Metabolic/Fluid and Electrolytes - Adult  Goal: Electrolytes maintained within normal limits  Outcome: Progressing     
Electrolytes - Adult  Goal: Electrolytes maintained within normal limits  Outcome: Progressing

## 2024-06-09 NOTE — PROGRESS NOTES
[x] Home medications reviewed and confirmed with patient     [x] There are one or more home medications that need clarification before Medication Reconciliation can be completed. The Med List Status has been marked as In Progress.     To assist with Home Medication Reconciliation the following actions have been taken:    [x] Pharmacy medication reconciliation service requested. (Note: This can be done by sending a Perfect Serve message to [facility] Pharmac[facility] or by phoning [phone].)    
Blood consent was confirmed for plasma  Plasma was dual signed per writer and an additional RN,   Plasma touched vein @ 1404  Vitals stable. Vitals were taken within 30 minutes prior to blood administration and 15 minutes after start  pt was taken down to IR for a paracentesis and needed it done while infusion was going. Vitals obtained once back to room.   Patient was observed for first 15 minutes per writer  No reaction noted. Will continue to monitor   
[facility]  Office: [phone]  Jimbo Feliz DO, Gordy Javier DO, Jerry Carmichael DO, Brandon Gilliam DO, Ale Wynne MD, Cornelia Marie MD, Dunia Adams MD, Khadijah Stoddard MD,  Raoul Hicks MD, Darryl Pritchard MD, Thuy Winn MD,  Rosanna Hinojosa DO, German Duarte MD, Paul Harrell MD, Armando Feliz DO, More Simon MD,  Bobby Prasad DO, Mandi Langston MD, Haleigh Saldana MD, Nancie Fajardo MD, John Diana MD,  Kenny Adkins MD, Zoya Yuan MD, Belem Chris MD, Tanya Priest MD, Efe Solorzano MD, Dorian Cao MD, Julian Mckeon DO, Daryl Richter DO, Molly Blanchard MD,  Lester Frey MD, Shirley Waterhouse, CNP,  Gabby Connell CNP, James Bal, CNP,  Bee Tovar, KARON, Jolanta Carter, CNP, La Leong, CNP, Willow Alberts, CNP, Hattie Allen, CNP, Sally Ya, PA-C, Donna West PA-C, Rosalia Ribera, CNP, Kenzie Avalos, CNP, Amarilys Montgomery, CNP, Jennifer Hidalgo, CNP, Chrissie Mcclure, CNP, Maggie Gross, CNS, Tea Armijo, CNP, Savanna Bey CNP, Tracy Schwab, CNP         [facility]   IN-PATIENT SERVICE   [facility]    Progress Note    6/8/2024    9:52 AM    Name:   Mishel Downing  MRN:     [id number]     Acct:      [id number]   Room:   1007/1007-02   Day:  1  Admit Date:  6/6/2024 10:22 PM    PCP:   Ty Angel MD  Code Status:  Full Code    Subjective:     C/C:   Chief Complaint   Patient presents with    Bloated     Pt has liver failure. Pt had paracentesis recently. Pt stated her stomach \"blew back up.\"     Chest Pain     Center of chest. Started today. Sharp pain.    Shortness of Breath    Nausea     Interval History Status: improved.     Patient feels much better after paracentesis yesterday.  Denies any complaints of chest pain, shortness of breath, nausea or vomiting, fevers chills or acute complaints.    Brief History:     This is a 38-year-old female with history of alcoholic cirrhosis with recurrent ascites that 
Hgb 6.9 this morning. Scout HANNON notified.  
Occupational Therapy  [facility]  Occupational Therapy Not Seen Note    Patient not available for Occupational Therapy due to:    [] Testing:    [] Hemodialysis    [] Cancelled by RN:    []Refusal by Patient:    [] Surgery:     [] Intubation:     [] Pain Medication:    [] Sedation:     [] Spine Precautions :    [] Medical Instability:    [x] Other: cx; pt at paracentesis    Darby Kim OTR/L              
PT c/o pain medication not helping relief enough pain. Secured message sent to attending in regards to matters. Waiting for response.   
Patient admitted to room 1007 from ER  VS taken, telemetry placed and call light given/within reach.   Patient A&O and given room orientation.   Orders released/reviewed  Initial assessment completed and admission database completed/started  Bed locked and lowest position  Bed alarm on for safety and non skid footwear placed on pt    Care ongoing   
Physical Therapy  DATE: 2024    NAME: Mishel Downing  MRN: [id number]   : 1985    Patient not seen this date for Physical Therapy due to:      [] Cancel by RN or physician due to:    [] Hemodialysis    [x] Critical Lab Value Level Hgb 6.9     [] Blood transfusion in progress    [] Acute or unstable cardiovascular status   _MAP < 55 or more than >115  _HR < 40 or > 130    [] Acute or unstable pulmonary status   -FiO2 > 60%   _RR < 5 or >40    _O2 sats < 85%    [] Strict Bedrest    [] Off Unit for surgery or procedure    [] Off Unit for testing       [] Pending imaging to R/O fracture    [] Refusal by Patient      [] Other      [] PT being discontinued at this time. Patient independent. No further needs.     [] PT being discontinued at this time as the patient has been transferred to hospice care. No further needs.      Brandon Ambriz, PT      
Physical Therapy  DATE: 2024    NAME: Mishel Downing  MRN: [id number]   : 1985    Patient not seen this date for Physical Therapy due to:      [] Cancel by RN or physician due to:    [] Hemodialysis    [] Critical Lab Value Level     [] Blood transfusion in progress    [] Acute or unstable cardiovascular status   _MAP < 55 or more than >115  _HR < 40 or > 130    [] Acute or unstable pulmonary status   -FiO2 > 60%   _RR < 5 or >40    _O2 sats < 85%    [] Strict Bedrest    [x] Off Unit procedure    [] Off Unit for testing       [] Pending imaging to R/O fracture    [] Refusal by Patient      [] Other      [] PT being discontinued at this time. Patient independent. No further needs.     [] PT being discontinued at this time as the patient has been transferred to hospice care. No further needs.      MIRTHA KRAUSE, PT      
Pt back to floor from paracentesis. Plasma finished and vitals obtained. Intake 281.97 from plasma.   
Pt off floor for paracentesis   
Pt ready for discharge. PIV removed without complications. All belongings packed up by pt and taken home. AVS reviewed with all questions answered. Pt going home via private vehicle.   
[facility]  Office: [phone]  Jimbo Feliz DO, Gordy Javier DO, Jerry Carmichael DO, Brandon Gilliam DO, Ale Wynne MD, Cornelia Marie MD, Dunia Adams MD, Khadijah Stoddard MD,  Raoul Hicks MD, Darryl Pritchard MD, Thuy Winn MD,  Rosanna Hinojosa DO, German Duarte MD, Paul Harrell MD, Armando Feliz DO, More Simon MD,  Bobby Prasad DO, Mandi Langston MD, Haleigh Saldana MD, Nancie Fajardo MD, John Diana MD,  Kenny Adkins MD, Zoya Yuan MD, Belem Chris MD, Tanya Priest MD, Efe Solorzano MD, Dorian Cao MD, Julian Mckeon DO, Daryl Richter DO, Molly Blanchard MD,  Lester Frey MD, Shirley Waterhouse, CNP,  Gabby Connell CNP, James Bal, CNP,  Bee Tovar, KARON, Jolanta Carter, CNP, La Leong, CNP, Willow Alberts, CNP, Hattie Allen, CNP, Sally Ya, PA-C, Donna West PA-C, Rosalia Ribera, CNP, Kenzie Avalos, CNP, Amarilys Montgomery, CNP, Jennifer Hidalgo, CNP, Chrissie Mcclure, CNP, Maggie Gross, CNS, Tea Armijo, CNP, Savanna Bey CNP, Tracy Schwab, CNP         [facility]   IN-PATIENT SERVICE   [facility]    Progress Note    6/9/2024    10:59 AM    Name:   Mishel Downing  MRN:     [id number]     Acct:      [id number]   Room:   1007/1007-02   Day:  2  Admit Date:  6/6/2024 10:22 PM    PCP:   Ty Angel MD  Code Status:  Full Code    Subjective:     C/C:   Chief Complaint   Patient presents with    Bloated     Pt has liver failure. Pt had paracentesis recently. Pt stated her stomach \"blew back up.\"     Chest Pain     Center of chest. Started today. Sharp pain.    Shortness of Breath    Nausea     Interval History Status: improved.     Patient denies any chest pain, shortness of breath, nausea or vomiting, fevers or chills.  Less abdominal bloating since paracentesis.  Overall feels well.    Brief History:     This is a 38-year-old female with history of alcoholic cirrhosis with recurrent ascites that presents with a 
Writer attended patient for first 15 min of blood transfusion and no potential blood reaction was recognized. VS documented. Will continue to monitor.       
0.07 06/08/2024 05:21 AM    DIFFTYPE NOT REPORTED 01/04/2020 05:13 PM     Platelets:    Lab Results   Component Value Date/Time    PLT See Reflexed IPF Result 06/08/2024 05:21 AM     Hemoglobin/Hematocrit:    Lab Results   Component Value Date/Time    HGB 6.9 06/08/2024 05:21 AM    HCT 22.0 06/08/2024 05:21 AM     CMP:    Lab Results   Component Value Date/Time     06/08/2024 05:21 AM    K 3.5 06/08/2024 05:21 AM    CL 95 06/08/2024 05:21 AM    CO2 28 06/08/2024 05:21 AM    BUN 7 06/08/2024 05:21 AM    CREATININE <0.4 06/08/2024 05:21 AM    GFRAA >60 05/15/2022 03:56 AM    LABGLOM Can not be calculated 06/08/2024 05:21 AM    GLUCOSE 88 06/08/2024 05:21 AM    CALCIUM 7.4 06/08/2024 05:21 AM    BILITOT 14.6 06/08/2024 05:21 AM    ALKPHOS 166 06/08/2024 05:21 AM    AST 62 06/08/2024 05:21 AM    ALT 36 06/08/2024 05:21 AM     Potassium:    Lab Results   Component Value Date/Time    K 3.5 06/08/2024 05:21 AM     BUN/Creatinine:    Lab Results   Component Value Date/Time    BUN 7 06/08/2024 05:21 AM    CREATININE <0.4 06/08/2024 05:21 AM     Hepatic Function Panel:    Lab Results   Component Value Date/Time    ALKPHOS 166 06/08/2024 05:21 AM    ALT 36 06/08/2024 05:21 AM    AST 62 06/08/2024 05:21 AM    BILITOT 14.6 06/08/2024 05:21 AM    BILIDIR 6.2 06/06/2024 11:00 PM    IBILI 10.2 06/06/2024 11:00 PM     PT/INR:    Lab Results   Component Value Date/Time    PROTIME 33.2 06/07/2024 05:00 AM    INR 3.3 06/07/2024 05:00 AM         Assessment:   38-year-old female with past medical history significant for alcoholic cirrhosis decompensated with ascites on Lasix and spironolactone, esophageal varices status post banding and hepatic encephalopathy on lactulose who presented to [facility] with complaints of worsening abdominal pain.  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  Patient seen by vascular surgery and not a good 
daily  magnesium oxide (MAG-OX) 400 (240 Mg) MG tablet, Take 1 tablet by mouth daily  bumetanide (BUMEX) 1 MG tablet, Take 1 tablet by mouth 2 times daily  ciprofloxacin (CIPRO) 500 MG tablet, Take 1 tablet by mouth daily  pantoprazole (PROTONIX) 40 MG tablet, Take 1 tablet by mouth 2 times daily

## 2024-06-12 LAB
MICROORGANISM SPEC CULT: NORMAL
MICROORGANISM/AGENT SPEC: NORMAL
SERVICE CMNT-IMP: NORMAL
SPECIMEN DESCRIPTION: NORMAL

## 2025-03-23 ENCOUNTER — HOSPITAL ENCOUNTER (OUTPATIENT)
Age: 40
Setting detail: SPECIMEN
Discharge: HOME | End: 2025-03-23

## 2025-03-23 LAB
ALBUMIN SERPL-MCNC: 3.1 G/DL (ref 3.5–5.2)
ALBUMIN/GLOB SERPL: 1.3 {RATIO} (ref 1–2.5)
ALP SERPL-CCNC: 114 U/L (ref 35–104)
ALT SERPL-CCNC: 12 U/L (ref 10–35)
ANION GAP SERPL CALCULATED.3IONS-SCNC: 14 MMOL/L (ref 9–16)
AST SERPL-CCNC: 23 U/L (ref 10–35)
BASOPHILS # BLD: 0.06 K/UL (ref 0–0.2)
BASOPHILS NFR BLD: 1 %
BILIRUB SERPL-MCNC: 1.7 MG/DL (ref 0–1.2)
BUN SERPL-MCNC: 8 MG/DL (ref 6–20)
CALCIUM SERPL-MCNC: 8.9 MG/DL (ref 8.6–10.4)
CHLORIDE SERPL-SCNC: 100 MMOL/L (ref 98–107)
CO2 SERPL-SCNC: 23 MMOL/L (ref 20–31)
CREAT SERPL-MCNC: 0.6 MG/DL (ref 0.5–0.9)
EOSINOPHIL # BLD: 0.19 K/UL (ref 0–0.4)
EOSINOPHILS RELATIVE PERCENT: 3 % (ref 1–4)
ERYTHROCYTE [DISTWIDTH] IN BLOOD BY AUTOMATED COUNT: 21.8 % (ref 11.8–14.4)
GFR, ESTIMATED: >90 ML/MIN/1.73M2
GLUCOSE SERPL-MCNC: 119 MG/DL (ref 74–99)
HCT VFR BLD AUTO: 32.7 % (ref 36.3–47.1)
HGB BLD-MCNC: 10.4 G/DL (ref 11.9–15.1)
IMM GRANULOCYTES # BLD AUTO: 0 K/UL (ref 0–0.3)
IMM GRANULOCYTES NFR BLD: 0 %
LYMPHOCYTES NFR BLD: 0.43 K/UL (ref 1–4.8)
LYMPHOCYTES RELATIVE PERCENT: 7 % (ref 24–44)
MCH RBC QN AUTO: 30.8 PG (ref 25.2–33.5)
MCHC RBC AUTO-ENTMCNC: 31.8 G/DL (ref 28.4–34.8)
MCV RBC AUTO: 96.7 FL (ref 82.6–102.9)
MONOCYTES NFR BLD: 0.56 K/UL (ref 0.2–0.8)
MONOCYTES NFR BLD: 9 % (ref 1–7)
MORPHOLOGY: ABNORMAL
NEUTROPHILS NFR BLD: 80 % (ref 36–66)
NEUTS SEG NFR BLD: 4.96 K/UL (ref 1.8–7.7)
NRBC BLD-RTO: 0 PER 100 WBC
PLATELET # BLD AUTO: 196 K/UL (ref 138–453)
PMV BLD AUTO: 9 FL (ref 8.1–13.5)
POTASSIUM SERPL-SCNC: 4.1 MMOL/L (ref 3.7–5.3)
PROT SERPL-MCNC: 5.5 G/DL (ref 6.6–8.7)
RBC # BLD AUTO: 3.38 M/UL (ref 3.95–5.11)
SODIUM SERPL-SCNC: 137 MMOL/L (ref 136–145)
WBC OTHER # BLD: 6.2 K/UL (ref 3.5–11.3)

## 2025-03-23 PROCEDURE — 85025 COMPLETE CBC W/AUTO DIFF WBC: CPT

## 2025-03-23 PROCEDURE — 80053 COMPREHEN METABOLIC PANEL: CPT

## 2025-03-23 PROCEDURE — 36415 COLL VENOUS BLD VENIPUNCTURE: CPT

## 2025-03-25 ENCOUNTER — HOSPITAL ENCOUNTER (OUTPATIENT)
Age: 40
Setting detail: SPECIMEN
Discharge: HOME | End: 2025-03-25

## 2025-03-25 LAB
ALBUMIN SERPL-MCNC: 3 G/DL (ref 3.5–5.2)
ALBUMIN/GLOB SERPL: 1.3 {RATIO} (ref 1–2.5)
ALP SERPL-CCNC: 102 U/L (ref 35–104)
ALT SERPL-CCNC: 9 U/L (ref 10–35)
ANION GAP SERPL CALCULATED.3IONS-SCNC: 11 MMOL/L (ref 9–16)
AST SERPL-CCNC: 17 U/L (ref 10–35)
BASOPHILS # BLD: 0.08 K/UL (ref 0–0.2)
BASOPHILS NFR BLD: 2 % (ref 0–2)
BILIRUB SERPL-MCNC: 1.5 MG/DL (ref 0–1.2)
BUN SERPL-MCNC: 7 MG/DL (ref 6–20)
CALCIUM SERPL-MCNC: 8.6 MG/DL (ref 8.6–10.4)
CHLORIDE SERPL-SCNC: 103 MMOL/L (ref 98–107)
CMV IGG SERPL QL IA: 15.8
CMV IGM SERPL QL IA: 0.2
CO2 SERPL-SCNC: 24 MMOL/L (ref 20–31)
CREAT SERPL-MCNC: 0.5 MG/DL (ref 0.5–0.9)
EOSINOPHIL # BLD: 0.21 K/UL (ref 0–0.44)
EOSINOPHILS RELATIVE PERCENT: 5 % (ref 1–4)
ERYTHROCYTE [DISTWIDTH] IN BLOOD BY AUTOMATED COUNT: 20.8 % (ref 11.8–14.4)
GFR, ESTIMATED: >90 ML/MIN/1.73M2
GGT SERPL-CCNC: 106 U/L (ref 5–36)
GLUCOSE SERPL-MCNC: 87 MG/DL (ref 74–99)
HCT VFR BLD AUTO: 31.2 % (ref 36.3–47.1)
HGB BLD-MCNC: 9.9 G/DL (ref 11.9–15.1)
IMM GRANULOCYTES # BLD AUTO: 0 K/UL (ref 0–0.3)
IMM GRANULOCYTES NFR BLD: 0 %
LYMPHOCYTES NFR BLD: 0.37 K/UL (ref 1.1–3.7)
LYMPHOCYTES RELATIVE PERCENT: 9 % (ref 24–43)
MAGNESIUM SERPL-MCNC: 1.4 MG/DL (ref 1.6–2.6)
MCH RBC QN AUTO: 30.1 PG (ref 25.2–33.5)
MCHC RBC AUTO-ENTMCNC: 31.7 G/DL (ref 28.4–34.8)
MCV RBC AUTO: 94.8 FL (ref 82.6–102.9)
MONOCYTES NFR BLD: 0.49 K/UL (ref 0.1–1.2)
MONOCYTES NFR BLD: 12 % (ref 3–12)
MORPHOLOGY: ABNORMAL
NEUTROPHILS NFR BLD: 72 % (ref 36–65)
NEUTS SEG NFR BLD: 2.95 K/UL (ref 1.5–8.1)
NRBC BLD-RTO: 0 PER 100 WBC
PHOSPHATE SERPL-MCNC: 4.3 MG/DL (ref 2.5–4.5)
PLATELET # BLD AUTO: 186 K/UL (ref 138–453)
PMV BLD AUTO: 9.6 FL (ref 8.1–13.5)
POTASSIUM SERPL-SCNC: 4.1 MMOL/L (ref 3.7–5.3)
PROT SERPL-MCNC: 5.3 G/DL (ref 6.6–8.7)
RBC # BLD AUTO: 3.29 M/UL (ref 3.95–5.11)
SODIUM SERPL-SCNC: 137 MMOL/L (ref 136–145)
WBC OTHER # BLD: 4.1 K/UL (ref 3.5–11.3)

## 2025-03-25 PROCEDURE — 80053 COMPREHEN METABOLIC PANEL: CPT

## 2025-03-25 PROCEDURE — 86644 CMV ANTIBODY: CPT

## 2025-03-25 PROCEDURE — 83735 ASSAY OF MAGNESIUM: CPT

## 2025-03-25 PROCEDURE — 85025 COMPLETE CBC W/AUTO DIFF WBC: CPT

## 2025-03-25 PROCEDURE — 86645 CMV ANTIBODY IGM: CPT

## 2025-03-25 PROCEDURE — 82977 ASSAY OF GGT: CPT

## 2025-03-25 PROCEDURE — 36415 COLL VENOUS BLD VENIPUNCTURE: CPT

## 2025-03-25 PROCEDURE — 80197 ASSAY OF TACROLIMUS: CPT

## 2025-03-25 PROCEDURE — 84100 ASSAY OF PHOSPHORUS: CPT

## 2025-03-27 ENCOUNTER — HOSPITAL ENCOUNTER (OUTPATIENT)
Age: 40
Setting detail: SPECIMEN
Discharge: HOME | End: 2025-03-27

## 2025-03-27 LAB
ALBUMIN SERPL-MCNC: 3.1 G/DL (ref 3.5–5.2)
ALBUMIN/GLOB SERPL: 1.3 {RATIO} (ref 1–2.5)
ALP SERPL-CCNC: 120 U/L (ref 35–104)
ALT SERPL-CCNC: 12 U/L (ref 10–35)
ANION GAP SERPL CALCULATED.3IONS-SCNC: 12 MMOL/L (ref 9–16)
AST SERPL-CCNC: 20 U/L (ref 10–35)
BASOPHILS # BLD: 0.06 K/UL (ref 0–0.2)
BASOPHILS NFR BLD: 2 % (ref 0–2)
BILIRUB SERPL-MCNC: 1.4 MG/DL (ref 0–1.2)
BUN SERPL-MCNC: 9 MG/DL (ref 6–20)
CALCIUM SERPL-MCNC: 8.7 MG/DL (ref 8.6–10.4)
CHLORIDE SERPL-SCNC: 104 MMOL/L (ref 98–107)
CMV IGG SERPL QL IA: 15.1
CMV IGM SERPL QL IA: 0.2
CO2 SERPL-SCNC: 23 MMOL/L (ref 20–31)
CREAT SERPL-MCNC: 0.5 MG/DL (ref 0.5–0.9)
EOSINOPHIL # BLD: 0.14 K/UL (ref 0–0.44)
EOSINOPHILS RELATIVE PERCENT: 5 % (ref 1–4)
ERYTHROCYTE [DISTWIDTH] IN BLOOD BY AUTOMATED COUNT: 20 % (ref 11.8–14.4)
GFR, ESTIMATED: >90 ML/MIN/1.73M2
GGT SERPL-CCNC: 113 U/L (ref 5–36)
GLUCOSE SERPL-MCNC: 79 MG/DL (ref 74–99)
HCT VFR BLD AUTO: 30.3 % (ref 36.3–47.1)
HGB BLD-MCNC: 9.6 G/DL (ref 11.9–15.1)
IMM GRANULOCYTES # BLD AUTO: 0 K/UL (ref 0–0.3)
IMM GRANULOCYTES NFR BLD: 0 %
LYMPHOCYTES NFR BLD: 0.42 K/UL (ref 1.1–3.7)
LYMPHOCYTES RELATIVE PERCENT: 15 % (ref 24–43)
MAGNESIUM SERPL-MCNC: 1.3 MG/DL (ref 1.6–2.6)
MCH RBC QN AUTO: 29.8 PG (ref 25.2–33.5)
MCHC RBC AUTO-ENTMCNC: 31.7 G/DL (ref 28.4–34.8)
MCV RBC AUTO: 94.1 FL (ref 82.6–102.9)
MONOCYTES NFR BLD: 0.36 K/UL (ref 0.1–1.2)
MONOCYTES NFR BLD: 13 % (ref 3–12)
NEUTROPHILS NFR BLD: 65 % (ref 36–65)
NEUTS SEG NFR BLD: 1.82 K/UL (ref 1.5–8.1)
NRBC BLD-RTO: 0 PER 100 WBC
PHOSPHATE SERPL-MCNC: 3.6 MG/DL (ref 2.5–4.5)
PLATELET # BLD AUTO: 162 K/UL (ref 138–453)
PMV BLD AUTO: 8.6 FL (ref 8.1–13.5)
POTASSIUM SERPL-SCNC: 4.1 MMOL/L (ref 3.7–5.3)
PROT SERPL-MCNC: 5.4 G/DL (ref 6.6–8.7)
RBC # BLD AUTO: 3.22 M/UL (ref 3.95–5.11)
SODIUM SERPL-SCNC: 138 MMOL/L (ref 136–145)
TACROLIMUS BLD-MCNC: 3.8 NG/ML
WBC OTHER # BLD: 2.8 K/UL (ref 3.5–11.3)

## 2025-03-27 PROCEDURE — 85025 COMPLETE CBC W/AUTO DIFF WBC: CPT

## 2025-03-27 PROCEDURE — 36415 COLL VENOUS BLD VENIPUNCTURE: CPT

## 2025-03-27 PROCEDURE — 84100 ASSAY OF PHOSPHORUS: CPT

## 2025-03-27 PROCEDURE — 86645 CMV ANTIBODY IGM: CPT

## 2025-03-27 PROCEDURE — 80053 COMPREHEN METABOLIC PANEL: CPT

## 2025-03-27 PROCEDURE — 83735 ASSAY OF MAGNESIUM: CPT

## 2025-03-27 PROCEDURE — 86644 CMV ANTIBODY: CPT

## 2025-03-27 PROCEDURE — 82977 ASSAY OF GGT: CPT

## 2025-03-27 PROCEDURE — 80197 ASSAY OF TACROLIMUS: CPT

## 2025-03-28 ENCOUNTER — HOSPITAL ENCOUNTER (OUTPATIENT)
Age: 40
Setting detail: SPECIMEN
Discharge: HOME | End: 2025-03-28

## 2025-03-28 LAB
ALBUMIN SERPL-MCNC: 3.1 G/DL (ref 3.5–5.2)
ALP SERPL-CCNC: 110 U/L (ref 35–104)
ALT SERPL-CCNC: 11 U/L (ref 10–35)
ANION GAP SERPL CALCULATED.3IONS-SCNC: 13 MMOL/L (ref 9–16)
AST SERPL-CCNC: 18 U/L (ref 10–35)
BASOPHILS # BLD: 0.03 K/UL (ref 0–0.2)
BASOPHILS NFR BLD: 1 %
BILIRUB SERPL-MCNC: 1.3 MG/DL (ref 0–1.2)
BUN SERPL-MCNC: 9 MG/DL (ref 6–20)
CALCIUM SERPL-MCNC: 9 MG/DL (ref 8.6–10.4)
CHLORIDE SERPL-SCNC: 105 MMOL/L (ref 98–107)
CO2 SERPL-SCNC: 21 MMOL/L (ref 20–31)
CREAT SERPL-MCNC: 0.5 MG/DL (ref 0.5–0.9)
EOSINOPHIL # BLD: 0.12 K/UL (ref 0–0.4)
EOSINOPHILS RELATIVE PERCENT: 4 % (ref 1–4)
ERYTHROCYTE [DISTWIDTH] IN BLOOD BY AUTOMATED COUNT: 19.6 % (ref 11.8–14.4)
GFR, ESTIMATED: >90 ML/MIN/1.73M2
GLUCOSE SERPL-MCNC: 75 MG/DL (ref 74–99)
HCT VFR BLD AUTO: 32.1 % (ref 36.3–47.1)
HGB BLD-MCNC: 9.9 G/DL (ref 11.9–15.1)
IMM GRANULOCYTES # BLD AUTO: 0 K/UL (ref 0–0.3)
IMM GRANULOCYTES NFR BLD: 0 %
LYMPHOCYTES NFR BLD: 0.55 K/UL (ref 1–4.8)
LYMPHOCYTES RELATIVE PERCENT: 19 % (ref 24–44)
MCH RBC QN AUTO: 29.8 PG (ref 25.2–33.5)
MCHC RBC AUTO-ENTMCNC: 30.8 G/DL (ref 28.4–34.8)
MCV RBC AUTO: 96.7 FL (ref 82.6–102.9)
MONOCYTES NFR BLD: 0.32 K/UL (ref 0.2–0.8)
MONOCYTES NFR BLD: 11 % (ref 1–7)
NEUTROPHILS NFR BLD: 65 % (ref 36–66)
NEUTS SEG NFR BLD: 1.88 K/UL (ref 1.8–7.7)
NRBC BLD-RTO: 0 PER 100 WBC
PLATELET # BLD AUTO: 165 K/UL (ref 138–453)
PMV BLD AUTO: 9.5 FL (ref 8.1–13.5)
POTASSIUM SERPL-SCNC: 4.1 MMOL/L (ref 3.7–5.3)
PROT SERPL-MCNC: 5.5 G/DL (ref 6.6–8.7)
RBC # BLD AUTO: 3.32 M/UL (ref 3.95–5.11)
SODIUM SERPL-SCNC: 139 MMOL/L (ref 136–145)
WBC OTHER # BLD: 2.9 K/UL (ref 3.5–11.3)

## 2025-03-28 PROCEDURE — 36415 COLL VENOUS BLD VENIPUNCTURE: CPT

## 2025-03-28 PROCEDURE — 80053 COMPREHEN METABOLIC PANEL: CPT

## 2025-03-28 PROCEDURE — 85025 COMPLETE CBC W/AUTO DIFF WBC: CPT

## 2025-03-29 LAB — TACROLIMUS BLD-MCNC: 4.3 NG/ML

## 2025-03-31 ENCOUNTER — HOSPITAL ENCOUNTER (OUTPATIENT)
Age: 40
Setting detail: SPECIMEN
Discharge: HOME | End: 2025-03-31

## 2025-03-31 LAB
ALBUMIN SERPL-MCNC: 3.3 G/DL (ref 3.5–5.2)
ALBUMIN/GLOB SERPL: 1.4 {RATIO} (ref 1–2.5)
ALP SERPL-CCNC: 111 U/L (ref 35–104)
ALT SERPL-CCNC: 8 U/L (ref 10–35)
ANION GAP SERPL CALCULATED.3IONS-SCNC: 12 MMOL/L (ref 9–16)
AST SERPL-CCNC: 18 U/L (ref 10–35)
BASOPHILS # BLD: 0 K/UL (ref 0–0.2)
BASOPHILS NFR BLD: 0 %
BILIRUB SERPL-MCNC: 1.3 MG/DL (ref 0–1.2)
BUN SERPL-MCNC: 9 MG/DL (ref 6–20)
CALCIUM SERPL-MCNC: 9 MG/DL (ref 8.6–10.4)
CHLORIDE SERPL-SCNC: 104 MMOL/L (ref 98–107)
CMV IGG SERPL QL IA: 14.4
CMV IGM SERPL QL IA: 0.2
CO2 SERPL-SCNC: 23 MMOL/L (ref 20–31)
CREAT SERPL-MCNC: 0.5 MG/DL (ref 0.5–0.9)
EOSINOPHIL # BLD: 0.06 K/UL (ref 0–0.4)
EOSINOPHILS RELATIVE PERCENT: 2 % (ref 1–4)
ERYTHROCYTE [DISTWIDTH] IN BLOOD BY AUTOMATED COUNT: 18.6 % (ref 11.8–14.4)
GFR, ESTIMATED: >90 ML/MIN/1.73M2
GGT SERPL-CCNC: 80 U/L (ref 5–36)
GLUCOSE SERPL-MCNC: 81 MG/DL (ref 74–99)
HCT VFR BLD AUTO: 32.7 % (ref 36.3–47.1)
HGB BLD-MCNC: 10.6 G/DL (ref 11.9–15.1)
IMM GRANULOCYTES # BLD AUTO: 0 K/UL (ref 0–0.3)
IMM GRANULOCYTES NFR BLD: 0 %
LYMPHOCYTES NFR BLD: 0.58 K/UL (ref 1–4.8)
LYMPHOCYTES RELATIVE PERCENT: 18 % (ref 24–44)
MAGNESIUM SERPL-MCNC: 1.3 MG/DL (ref 1.6–2.6)
MCH RBC QN AUTO: 29.9 PG (ref 25.2–33.5)
MCHC RBC AUTO-ENTMCNC: 32.4 G/DL (ref 28.4–34.8)
MCV RBC AUTO: 92.4 FL (ref 82.6–102.9)
MONOCYTES NFR BLD: 0.22 K/UL (ref 0.2–0.8)
MONOCYTES NFR BLD: 7 % (ref 1–7)
NEUTROPHILS NFR BLD: 73 % (ref 36–66)
NEUTS SEG NFR BLD: 2.34 K/UL (ref 1.8–7.7)
NRBC BLD-RTO: 0 PER 100 WBC
PHOSPHATE SERPL-MCNC: 4.2 MG/DL (ref 2.5–4.5)
PLATELET # BLD AUTO: 152 K/UL (ref 138–453)
PMV BLD AUTO: 9.9 FL (ref 8.1–13.5)
POTASSIUM SERPL-SCNC: 3.9 MMOL/L (ref 3.7–5.3)
PROT SERPL-MCNC: 5.7 G/DL (ref 6.6–8.7)
RBC # BLD AUTO: 3.54 M/UL (ref 3.95–5.11)
SODIUM SERPL-SCNC: 139 MMOL/L (ref 136–145)
WBC OTHER # BLD: 3.2 K/UL (ref 3.5–11.3)

## 2025-03-31 PROCEDURE — 36415 COLL VENOUS BLD VENIPUNCTURE: CPT

## 2025-03-31 PROCEDURE — 80197 ASSAY OF TACROLIMUS: CPT

## 2025-03-31 PROCEDURE — 86645 CMV ANTIBODY IGM: CPT

## 2025-03-31 PROCEDURE — 87522 HEPATITIS C REVRS TRNSCRPJ: CPT

## 2025-03-31 PROCEDURE — 86644 CMV ANTIBODY: CPT

## 2025-03-31 PROCEDURE — 85025 COMPLETE CBC W/AUTO DIFF WBC: CPT

## 2025-03-31 PROCEDURE — 83735 ASSAY OF MAGNESIUM: CPT

## 2025-03-31 PROCEDURE — 82977 ASSAY OF GGT: CPT

## 2025-03-31 PROCEDURE — 87536 HIV-1 QUANT&REVRSE TRNSCRPJ: CPT

## 2025-03-31 PROCEDURE — 80053 COMPREHEN METABOLIC PANEL: CPT

## 2025-03-31 PROCEDURE — 84100 ASSAY OF PHOSPHORUS: CPT

## 2025-04-01 ENCOUNTER — HOSPITAL ENCOUNTER (OUTPATIENT)
Age: 40
Setting detail: SPECIMEN
Discharge: HOME | End: 2025-04-01

## 2025-04-01 LAB
ALBUMIN SERPL-MCNC: 3.2 G/DL (ref 3.5–5.2)
ALBUMIN/GLOB SERPL: 1.3 {RATIO} (ref 1–2.5)
ALP SERPL-CCNC: 110 U/L (ref 35–104)
ALT SERPL-CCNC: 10 U/L (ref 10–35)
ANION GAP SERPL CALCULATED.3IONS-SCNC: 13 MMOL/L (ref 9–16)
AST SERPL-CCNC: 18 U/L (ref 10–35)
BASOPHILS # BLD: 0.06 K/UL (ref 0–0.2)
BASOPHILS NFR BLD: 2 % (ref 0–2)
BILIRUB SERPL-MCNC: 1.1 MG/DL (ref 0–1.2)
BUN SERPL-MCNC: 7 MG/DL (ref 6–20)
CALCIUM SERPL-MCNC: 9 MG/DL (ref 8.6–10.4)
CHLORIDE SERPL-SCNC: 104 MMOL/L (ref 98–107)
CO2 SERPL-SCNC: 21 MMOL/L (ref 20–31)
CREAT SERPL-MCNC: 0.5 MG/DL (ref 0.5–0.9)
EOSINOPHIL # BLD: 0.26 K/UL (ref 0–0.44)
EOSINOPHILS RELATIVE PERCENT: 8 % (ref 1–4)
ERYTHROCYTE [DISTWIDTH] IN BLOOD BY AUTOMATED COUNT: 18.6 % (ref 11.8–14.4)
GFR, ESTIMATED: >90 ML/MIN/1.73M2
GLUCOSE SERPL-MCNC: 76 MG/DL (ref 74–99)
HCT VFR BLD AUTO: 32.6 % (ref 36.3–47.1)
HCV RNA # SERPL NAA+PROBE: NOT DETECTED {COPIES}/ML
HGB BLD-MCNC: 10.2 G/DL (ref 11.9–15.1)
HIV1 RNA # SERPL NAA+PROBE: NOT DETECTED {COPIES}/ML
IMM GRANULOCYTES # BLD AUTO: 0 K/UL (ref 0–0.3)
IMM GRANULOCYTES NFR BLD: 0 %
LYMPHOCYTES NFR BLD: 0.48 K/UL (ref 1.1–3.7)
LYMPHOCYTES RELATIVE PERCENT: 15 % (ref 24–43)
MCH RBC QN AUTO: 29.4 PG (ref 25.2–33.5)
MCHC RBC AUTO-ENTMCNC: 31.3 G/DL (ref 28.4–34.8)
MCV RBC AUTO: 93.9 FL (ref 82.6–102.9)
MONOCYTES NFR BLD: 0.35 K/UL (ref 0.1–1.2)
MONOCYTES NFR BLD: 11 % (ref 3–12)
NEUTROPHILS NFR BLD: 64 % (ref 36–65)
NEUTS SEG NFR BLD: 2.05 K/UL (ref 1.5–8.1)
NRBC BLD-RTO: 0 PER 100 WBC
PLATELET # BLD AUTO: 133 K/UL (ref 138–453)
PMV BLD AUTO: 9.5 FL (ref 8.1–13.5)
POTASSIUM SERPL-SCNC: 4.1 MMOL/L (ref 3.7–5.3)
PROT SERPL-MCNC: 5.6 G/DL (ref 6.6–8.7)
RBC # BLD AUTO: 3.47 M/UL (ref 3.95–5.11)
SODIUM SERPL-SCNC: 138 MMOL/L (ref 136–145)
SPECIMEN SOURCE: NORMAL
SPECIMEN SOURCE: NORMAL
WBC OTHER # BLD: 3.2 K/UL (ref 3.5–11.3)

## 2025-04-01 PROCEDURE — 80053 COMPREHEN METABOLIC PANEL: CPT

## 2025-04-01 PROCEDURE — 85025 COMPLETE CBC W/AUTO DIFF WBC: CPT

## 2025-04-01 PROCEDURE — 36415 COLL VENOUS BLD VENIPUNCTURE: CPT

## 2025-04-02 LAB — TACROLIMUS BLD-MCNC: 5 NG/ML

## 2025-04-03 ENCOUNTER — HOSPITAL ENCOUNTER (OUTPATIENT)
Age: 40
Setting detail: SPECIMEN
Discharge: HOME | End: 2025-04-03